# Patient Record
Sex: MALE | Race: WHITE | Employment: OTHER | URBAN - METROPOLITAN AREA
[De-identification: names, ages, dates, MRNs, and addresses within clinical notes are randomized per-mention and may not be internally consistent; named-entity substitution may affect disease eponyms.]

---

## 2019-05-09 ENCOUNTER — HOSPITAL ENCOUNTER (OUTPATIENT)
Dept: VASCULAR SURGERY | Age: 64
Discharge: HOME | End: 2019-05-09
Attending: FAMILY MEDICINE
Payer: COMMERCIAL

## 2019-05-09 DIAGNOSIS — R10.13 ABDOMINAL PAIN, EPIGASTRIC: ICD-10-CM

## 2019-05-09 LAB
CELIAC EDV: 48.5 CM/S
CELIAC PSV: 170.6 CM/S
DIST SMA EDV: 13 CM/S
DIST SMA PSV: 132.1 CM/S
MID SMA EDV: 38.8 CM/S
MID SMA PSV: 156.5 CM/S
PROX SMA EDV: 24.3 CM/S
PROX SMA PSV: 134.4 CM/S

## 2019-05-09 PROCEDURE — 93975 VASCULAR STUDY: CPT

## 2019-10-21 ENCOUNTER — OFFICE VISIT (OUTPATIENT)
Dept: ORTHOPEDIC SURGERY | Age: 64
End: 2019-10-21

## 2019-10-21 VITALS
SYSTOLIC BLOOD PRESSURE: 101 MMHG | DIASTOLIC BLOOD PRESSURE: 76 MMHG | HEIGHT: 74 IN | OXYGEN SATURATION: 94 % | HEART RATE: 87 BPM | BODY MASS INDEX: 24.59 KG/M2 | WEIGHT: 191.6 LBS | TEMPERATURE: 97.6 F

## 2019-10-21 DIAGNOSIS — S39.012A STRAIN OF LUMBAR REGION, INITIAL ENCOUNTER: ICD-10-CM

## 2019-10-21 DIAGNOSIS — M54.50 LUMBAR PAIN: Primary | ICD-10-CM

## 2019-10-21 DIAGNOSIS — M47.819 FACET ARTHROPATHY: ICD-10-CM

## 2019-10-21 RX ORDER — KETOROLAC TROMETHAMINE 15 MG/ML
60 INJECTION, SOLUTION INTRAMUSCULAR; INTRAVENOUS ONCE
Qty: 1 VIAL | Refills: 0
Start: 2019-10-21 | End: 2019-10-21

## 2019-10-21 RX ORDER — METHOCARBAMOL 500 MG/1
500 TABLET, FILM COATED ORAL
Qty: 30 TAB | Refills: 0 | Status: SHIPPED | OUTPATIENT
Start: 2019-10-21 | End: 2020-01-09

## 2019-10-21 RX ORDER — METHYLPREDNISOLONE 4 MG/1
TABLET ORAL
Qty: 1 DOSE PACK | Refills: 0 | Status: SHIPPED | OUTPATIENT
Start: 2019-10-21 | End: 2020-01-09

## 2019-10-21 NOTE — PATIENT INSTRUCTIONS
Theracane for massage     Back Strain: Care Instructions  Overview    A back strain happens when you overstretch, or pull, a muscle in your back. You may hurt your back in an accident or when you exercise or lift something. Sometimes you may not know how you hurt your back. Most back pain will get better with rest and time. You can take care of yourself at home to help your back heal.  Follow-up care is a key part of your treatment and safety. Be sure to make and go to all appointments, and call your doctor if you are having problems. It's also a good idea to know your test results and keep a list of the medicines you take. How can you care for yourself at home? · Try to stay as active as you can, but stop or reduce any activity that causes pain. · Put ice or a cold pack on the sore muscle for 10 to 20 minutes at a time to stop swelling. Try this every 1 to 2 hours for 3 days (when you are awake) or until the swelling goes down. Put a thin cloth between the ice pack and your skin. · After 2 or 3 days, apply a heating pad on low or a warm cloth to your back. Some doctors suggest that you go back and forth between hot and cold treatments. · Take pain medicines exactly as directed. ? If the doctor gave you a prescription medicine for pain, take it as prescribed. ? If you are not taking a prescription pain medicine, ask your doctor if you can take an over-the-counter medicine. · Try sleeping on your side with a pillow between your legs. Or put a pillow under your knees when you lie on your back. These measures can ease pain in your lower back. · Return to your usual level of activity slowly. When should you call for help? Call 911 anytime you think you may need emergency care. For example, call if:    · You are unable to move a leg at all.   [facility] your doctor now or seek immediate medical care if:    · You have new or worse symptoms in your legs, belly, or buttocks.  Symptoms may include:  ? Numbness or tingling. ? Weakness. ? Pain.     · You lose bladder or bowel control.    Watch closely for changes in your health, and be sure to contact your doctor if:    · You have a fever, lose weight, or don't feel well.     · You are not getting better as expected. Where can you learn more? Go to http://katelynn-sydnee.info/. Enter P715 in the search box to learn more about \"Back Strain: Care Instructions. \"  Current as of: June 26, 2019  Content Version: 12.2  © 6207-1757 [employer]. Care instructions adapted under license by [employer] (which disclaims liability or warranty for this information). If you have questions about a medical condition or this instruction, always ask your healthcare professional. [address] any warranty or liability for your use of this information. Back Stretches: Exercises  Introduction  Here are some examples of exercises for stretching your back. Start each exercise slowly. Ease off the exercise if you start to have pain. Your doctor or physical therapist will tell you when you can start these exercises and which ones will work best for you. How to do the exercises  Overhead stretch    1. Stand comfortably with your feet shoulder-width apart. 2. Looking straight ahead, raise both arms over your head and reach toward the ceiling. Do not allow your head to tilt back. 3. Hold for 15 to 30 seconds, then lower your arms to your sides. 4. Repeat 2 to 4 times. Side stretch    1. Stand comfortably with your feet shoulder-width apart. 2. Raise one arm over your head, and then lean to the other side. 3. Slide your hand down your leg as you let the weight of your arm gently stretch your side muscles. Hold for 15 to 30 seconds. 4. Repeat 2 to 4 times on each side. Press-up    1. Lie on your stomach, supporting your body with your forearms. 2. Press your elbows down into the floor to raise your upper back.  As you do this, relax your stomach muscles and allow your back to arch without using your back muscles. As your press up, do not let your hips or pelvis come off the floor. 3. Hold for 15 to 30 seconds, then relax. 4. Repeat 2 to 4 times. Relax and rest    1. Lie on your back with a rolled towel under your neck and a pillow under your knees. Extend your arms comfortably to your sides. 2. Relax and breathe normally. 3. Remain in this position for about 10 minutes. 4. If you can, do this 2 or 3 times each day. Follow-up care is a key part of your treatment and safety. Be sure to make and go to all appointments, and call your doctor if you are having problems. It's also a good idea to know your test results and keep a list of the medicines you take. Where can you learn more? Go to http://katelynn-sydnee.info/. Enter H222 in the search box to learn more about \"Back Stretches: Exercises. \"  Current as of: June 26, 2019  Content Version: 12.2  © 6436-3348 [employer]. Care instructions adapted under license by [employer] (which disclaims liability or warranty for this information). If you have questions about a medical condition or this instruction, always ask your healthcare professional. [address] any warranty or liability for your use of this information.

## 2019-10-21 NOTE — LETTER
10/21/19 Patient: Dewain Essex YOB: 1955 Date of Visit: 10/21/2019 Cinthya Reed MD 
[address] [facility] [address] VIA Facsimile: [phone] Dear Cinthya Reed MD, Thank you for referring Mr. Dewain Essex to [facility] for evaluation. My notes for this consultation are attached. If you have questions, please do not hesitate to call me. I look forward to following your patient along with you. Sincerely, Yenni Durán NP

## 2019-10-21 NOTE — PROGRESS NOTES
[place] Jones[place]ienne 1955 male who presents for Toradol 60 mg. Patient denies any symptoms , reactions or allergies that would exclude them from receiving injection today. Risks and adverse reactions were discussed and the VIS was given to them. All questions were addressed. Order placed for Margret Rg,  per Verbal Order from Dr. Samantha Simms with read back. Patient was observed for 15 min post injection. There were no reactions observed.     Sukhjinder Joel

## 2019-10-21 NOTE — PROGRESS NOTES
[facility]  [address] [facility]  Phone: [phone]  Fax: [phone]  NEW PATIENT  Patient: Aniceto Paulson                MRN: [id number]       SSN: [id number]  YOB: 1955        AGE: 62 y.o. SEX: male  Body mass index is 24.6 kg/m². PCP: Lindy Ovalle MD  10/21/19    No chief complaint on file. Aniceto Paulson is seen today in consultation at the request of self referral for complaints of back     HISTORY OF PRESENT ILLNESS:  Aniceto Paulson is a 62 y.o.  male with history of acute LBP pain for 1 week and radiation of pain to RLE in the anterior thigh in the L3 distribution. He reports that he was moving a mattress and he pulled his back. It is in the RLBP around the L4 region. He is neuro intact. His  Prior history of back problems: he has known facet arthropathy w/ foraminal stenosis L4-S1 per LS MRI 2016. He has diabetic peripheral neuropathy. . He denies any previous back issues. He has tried; PT-No,  Non-opioid medications Yes, and spinal injections No. Pain is aching and throbbing. Pain is worse with lifting, twisting and affects recreational activities. Pain is better with nothing. On exam, he has a R- sided TP consistent w/ an acute muscle strain. He is neuro intact. Denies bladder/bowel dysfunction, saddle paresthesia, weakness, gait disturbance, or other neurological deficits. Denies chills, fever,night sweats, unexplained weight loss/weight gain, chest pain, sob or anxiety. Pt at this time desires to  continue with current care/proceed with medication evaluation/proceed with evaluation of back pain. Medications OTC NSAIDs some benefit, has Motrin 600mg    PMHx: OTC Motrin w/     RADIOGRAPHS  4V LS Today  Moderate degenerative changes maybe increased since 2016  Final interpretation for Dr. Alondra Tello    ASSESSMENT   Diagnoses and all orders for this visit:    1.  Lumbar pain  -     AMB POC XRAY, SPINE, LUMBOSACRAL; 4+    2. Facet arthropathy    3. Strain of lumbar region, initial encounter  -     KETOROLAC TROMETHAMINE INJ  -     TN THER/PROPH/DIAG INJECTION, SUBCUT/IM    Other orders  -     methylPREDNISolone (MEDROL, TORO,) 4 mg tablet; Per dose pack instructions  -     ketorolac (TORADOL) 15 mg/mL soln injection; 4 mL by IntraMUSCular route once for 1 dose. -     methocarbamol (ROBAXIN) 500 mg tablet; Take 1 Tab by mouth two (2) times daily as needed for Pain. IMPRESSION AND PLAN:  This is a pt with an acute back strain and facet arthropathy w/ now RLE pain, neuro intact. I explained the Tx for an acute muscle strain and how exercises are important. He does not want PT, he will try some yoga stretches. We discussed meds, his current BS is . He will check his BS while he is on the MDP and stop it if it goes above 180 and call his PCP. Of note he was concerned about \"soft bones\" he does have osteopenia on x-ray, I offered ordering a DEXA he declined    > Pt was given information on BACK STRAIN AND EXERCISES   > Toradol foll MDP foll Motrin  > PRN Robaxin  > HEP  > Mr. Alivia Baugh has a reminder for a \"due or due soon\" health maintenance. I have asked that he contact his primary care provider, Ashish Pantoja MD, for follow-up on this health maintenance.  > We have informed patient to notify us for immediate appointment if he has any worsening neurogical symptoms or if an emergency situation presents, then call 911  >  has been reviewed and is appropriate  > Pt will follow-up in 6 wks w/ me.     Subjective    Work retired    Smoking Status non smoker    Pain Scale: 6/10    Pain Assessment  10/21/2019   Location of Pain Hip   Location Modifiers Right   Severity of Pain 8   Quality of Pain Aching   Duration of Pain A few hours   Frequency of Pain Intermittent   Aggravating Factors Standing;Walking;Straightening   Limiting Behavior Yes   Relieving Factors Rest         REVIEW OF SYSTEMS  Constitutional: Negative for fever, chills, or weight change. Respiratory: Negative for cough or shortness of breath. Cardiovascular: Negative for chest pain or palpitations. Gastrointestinal: Negative for incontinence, acid reflux, change in bowel habits, or constipation. Genitourinary: Negative for incontinence, dysuria and flank pain. Musculoskeletal: Positive for back and RLE pain. See HPI. Skin: Negative for rash. Neurological:RLE anterior thigh pain  radiculopathy. See HPI. Endo/Heme/Allergies: Negative. Psychiatric/Behavioral: Negative. PHYSICAL EXAMINATION  Visit Vitals  /76 (BP 1 Location: Left arm, BP Patient Position: Sitting)   Pulse 87   Temp 97.6 °F (36.4 °C) (Oral)   Ht 6' 2\" (1.88 m)   Wt 191 lb 9.6 oz (86.9 kg)   SpO2 94%   BMI 24.60 kg/m²         Accompanied by self. Constitutional:  Well developed, well nourished, in no acute distress. Psychiatric: Affect and mood are appropriate. Integumentary: No rashes or abrasions noted on exposed areas. Cardiovascular/Peripheral Vascular: +2 radial & pedal pulses. No peripheral edema is noted. Lymphatic:  No evidence of lymphedema. No cervical lymphadenopathy. SPINE/MUSCULOSKELETAL EXAM     Lumbar spine:  No rash, ecchymosis, or gross obliquity. No fasciculations. No focal atrophy is noted. Range of motion is intact with flexion, extension, turning right, turning left. Tenderness to palpation R LB L4 w/ TP. No tenderness to palpation at the sciatic notch. SI joints non-tender. Trochanters non tender. Straight leg raise Neg    Sensation grossly intact to light touch. MOTOR:     Hip Flex Quads Hamstrings Ankle DF EHL Ankle PF   Right 5/5 5/5 5/5 5/5 5/5 5/5   Left 5/5 5/5 5/5 5/5 5/5 5/5       DTRs are 2+ , patella, and Achilles. Ambulation without assistive device.  FWB.    normal gait and station        PAST MEDICAL HISTORY   Past Medical History:   Diagnosis Date    Anxiety     Classic migraine with aura     Dizziness     Fatigue     GERD (gastroesophageal reflux disease)     HTN (hypertension)     Insomnia     Kidney stones     Male erectile dysfunction     Polydipsia     Polyuria     Vitamin D deficiency        Past Surgical History:   Procedure Laterality Date    HX LITHOTRIPSY      2006, 2008, 2010 and 2013   . MEDICATIONS      Current Outpatient Medications   Medication Sig Dispense Refill    methylPREDNISolone (MEDROL, TORO,) 4 mg tablet Per dose pack instructions 1 Dose Pack 0    ketorolac (TORADOL) 15 mg/mL soln injection 4 mL by IntraMUSCular route once for 1 dose. 1 Vial 0    methocarbamol (ROBAXIN) 500 mg tablet Take 1 Tab by mouth two (2) times daily as needed for Pain. 30 Tab 0    cyanocobalamin (VITAMIN B-12) 100 mcg tablet Take 100 mcg by mouth daily.  gabapentin (NEURONTIN) 300 mg capsule       traZODone (DESYREL) 50 mg tablet Take 50 mg by mouth nightly as needed for Sleep.  ergocalciferol (ERGOCALCIFEROL) 50,000 unit capsule Take 50,000 Units by mouth. Take 1 capsule by mouth twice a week.  atorvastatin (LIPITOR) 40 mg tablet Take 1 Tab by mouth nightly. 30 Tab 0    OTHER Glucometer, test strips and lancets  Dispense: Sufficient quantity to check blood glucose four times daily for 30 days. Dx: DMII 1 Each 0    lisinopril (PRINIVIL, ZESTRIL) 20 mg tablet Take  by mouth daily.  Indications: HYPERTENSION          ALLERGIES  No Known Allergies       SOCIAL HISTORY    Social History     Socioeconomic History    Marital status: UNKNOWN     Spouse name: Not on file    Number of children: Not on file    Years of education: Not on file    Highest education level: Not on file   Occupational History    Occupation: Retired   Social Needs    Financial resource strain: Not on file    Food insecurity:     Worry: Not on file     Inability: Not on file   [employer] needs:     Medical: Not on file     Non-medical: Not on file   Tobacco Use    Smoking status: Former Smoker    Smokeless tobacco: Never Used   Substance and Sexual Activity    Alcohol use: Yes     Alcohol/week: 0.0 standard drinks    Drug use: No    Sexual activity: Not Currently   Lifestyle    Physical activity:     Days per week: Not on file     Minutes per session: Not on file    Stress: Not on file   Relationships    Social connections:     Talks on phone: Not on file     Gets together: Not on file     Attends Sabianism service: Not on file     Active member of club or organization: Not on file     Attends meetings of clubs or organizations: Not on file     Relationship status: Not on file    Intimate partner violence:     Fear of current or ex partner: Not on file     Emotionally abused: Not on file     Physically abused: Not on file     Forced sexual activity: Not on file   Other Topics Concern    Not on file   Social History Narrative    Not on file       FAMILY HISTORY  No family history on file.       Robina Winston NP

## 2019-11-12 NOTE — PROGRESS NOTES
Called and spoke with pt last Friday regarding LS xray w/ L3 comp fx and f/o w/ pain. He states he is now pain free, he cannot recall an injury in the past where he might have fx his back. He will follow up PRN since he is now pain free.

## 2019-12-26 PROBLEM — N20.0 KIDNEY STONE: Status: ACTIVE | Noted: 2019-12-26

## 2020-02-17 ENCOUNTER — OFFICE VISIT (OUTPATIENT)
Dept: ORTHOPEDIC SURGERY | Age: 65
End: 2020-02-17

## 2020-02-17 VITALS
OXYGEN SATURATION: 97 % | SYSTOLIC BLOOD PRESSURE: 145 MMHG | HEART RATE: 83 BPM | TEMPERATURE: 97.8 F | HEIGHT: 74 IN | DIASTOLIC BLOOD PRESSURE: 82 MMHG | WEIGHT: 186 LBS | RESPIRATION RATE: 16 BRPM | BODY MASS INDEX: 23.87 KG/M2

## 2020-02-17 DIAGNOSIS — S32.030D CLOSED COMPRESSION FRACTURE OF L3 LUMBAR VERTEBRA WITH ROUTINE HEALING, SUBSEQUENT ENCOUNTER: Primary | ICD-10-CM

## 2020-02-17 DIAGNOSIS — M81.0 AGE-RELATED OSTEOPOROSIS WITHOUT CURRENT PATHOLOGICAL FRACTURE: ICD-10-CM

## 2020-02-17 RX ORDER — OXYCODONE AND ACETAMINOPHEN 7.5; 325 MG/1; MG/1
1 TABLET ORAL 4 TIMES DAILY
COMMUNITY
End: 2022-03-29

## 2020-02-17 NOTE — PATIENT INSTRUCTIONS
Teriparatide (By injection)   Teriparatide (ter-i-PAR-a-tide)  Treats osteoporosis (weak or brittle bones) in men, and in women who have gone through menopause. Brand Name(s): Forteo   There may be other brand names for this medicine. When This Medicine Should Not Be Used: You should not use this medicine if you have had an allergic reaction to teriparatide. How to Use This Medicine:   Injectable  · Your doctor will prescribe your exact dose and tell you how often it should be given. This medicine is given as a shot under your skin. · This medicine should come with a Medication Guide. Ask your pharmacist for a copy if you do not have one. · You may be taught how to give your medicine at home. Make sure you understand all instructions before giving yourself an injection. Do not use more medicine or use it more often than your doctor tells you to. · You will be shown the body areas where this shot can be given. Use a different body area each time you give yourself a shot. Keep track of where you give each shot to make sure you rotate body areas. · This medicine comes in a special pre-filled pen. Be sure you understand how to use the pen to give yourself a shot. · Give yourself the shot in your thigh muscle or in your lower stomach area. Be sure to put the cap back on the pen after giving yourself a shot. Put the pen back in the refrigerator right after you are done giving yourself the shot. · You can use this medicine at any time of the day, however using it at the same time each day may help you remember to take your shot. · When you first start using this medicine, give yourself a shot while you are in an area where it is easy for you to sit or lay down right away if you become dizzy. · Do not use this medicine if it looks cloudy, colored, or if it has specks in it. The medicine inside the pen should be clear and colorless.   · Do not use a pen for more than 28 days (4 weeks) after it has been opened, even if there is still some medicine in it. · You should not use this medicine for longer than 2 years. It is only part of a complete plan for treating osteoporosis. Ask your doctor about other things you can do to help yourself. This may include taking vitamin or mineral supplements, getting regular exercise, and not smoking. If a dose is missed:   · Take a dose as soon as you remember. If it is almost time for your next dose, wait until then and take a regular dose. Do not take extra medicine to make up for a missed dose. How to Store and Dispose of This Medicine:   · Store the medicine pen in the refrigerator. Take the pen out of the refrigerator only long enough to give yourself a shot. Then put it back in the refrigerator right away. Do not freeze the pen. Do not use this medicine if it has been frozen. · Ask your pharmacist, doctor, or health caregiver about the best way to dispose of any leftover medicine and the used pen. You will also need to throw away old medicine 28 days after the first time you use the pen, or after the expiration date has passed. · Keep all medicine out of the reach of children. Never share your medicine with anyone. Drugs and Foods to Avoid:   Ask your doctor or pharmacist before using any other medicine, including over-the-counter medicines, vitamins, and herbal products. · Make sure your doctor knows if you are also using digoxin (Lanoxin®). Warnings While Using This Medicine:   · Make sure your doctor knows if you are pregnant or breastfeeding, or if you have heart disease, liver disease, kidney disease, kidney stones, or if you are on dialysis. · Make sure your doctor knows if you have hypercalcemia (high levels of calcium in your blood), or if you have a disease that may cause you to have hypercalcemia, such as hyperparathyroidism (overactive parathyroid gland). · When this medicine was tested in rats, some of the rats developed bone cancer.  It is not known if the same thing could happen in people. Your risk of bone cancer may be higher if you have Paget's disease or another bone disease, or if you are still growing. Your risk may also be higher if you have ever had bone cancer, or if you have had external beam or implant radiation treatment on your bones. Make sure your doctor knows if you have ever had any of these bone conditions or treatments. · This medicine may make you dizzy or drowsy. Avoid driving, using machines, or doing anything else that could be dangerous if you are not alert. · You are more likely to get dizzy, lightheaded, or have a pounding heartbeat when you first start using this medicine. Sit or lie down if this happens. Call your doctor if these side effects do not go away, or get worse. · Tell any doctor or dentist who treats you that you are using this medicine. This medicine may affect certain medical test results. · Your doctor will do lab tests at regular visits to check on the effects of this medicine. Keep all appointments. Possible Side Effects While Using This Medicine:   Call your doctor right away if you notice any of these side effects:  · Allergic reaction: Itching or hives, swelling in your face or hands, swelling or tingling in your mouth or throat, chest tightness, trouble breathing  · Chest pain, fast heartbeat. · Leg cramps, joint pain, or muscle spasms. · Lightheadedness or fainting. · Nausea, vomiting, or constipation. · Unusual tiredness or weakness. If you notice these less serious side effects, talk with your doctor:   · Diarrhea or upset stomach. · Headache or neck pain. · Redness, pain, swelling, itching, bruising, or bleeding where the shot was given. · Runny or stuffy nose, cough. · Skin rash. · Sweating. · Trouble sleeping. If you notice other side effects that you think are caused by this medicine, tell your doctor. Call your doctor for medical advice about side effects.  You may report side effects to FDA at 1-800-FDA-1088  © 2017 [facility] Information is for End User's use only and may not be sold, redistributed or otherwise used for commercial purposes. The above information is an  only. It is not intended as medical advice for individual conditions or treatments. Talk to your doctor, nurse or pharmacist before following any medical regimen to see if it is safe and effective for you.

## 2020-02-17 NOTE — PROGRESS NOTES
[facility]  [address] [facility], [facility] [address]  Phone: [phone]  Fax: [phone]  PROGRESS NOTE  Patient: Rajan Dang                MRN: [id number]       SSN: [id number]  YOB: 1955        AGE: 62 y.o. SEX: male  Body mass index is 23.88 kg/m². PCP: Meghana Das MD  02/17/20    Chief Complaint   Patient presents with    Back Pain     lumbar pain, f/u appt. HISTORY OF PRESENT ILLNESS:  Rajan Dang is a 62 y.o.  male with history of acute LBP pain  radiation of pain to RLE in the anterior thigh in the L3 distribution after moving a mattress 4 mo ago. I obtained x-rays which showed an L3 fx and degenerative changes. I wanted to obtain an MRI, he didn't get one as he reported that he was pain free when I called him to follow-up. Today, he comes in for follow up of his hx of L3 fx. He has no back or leg pain. He is pain free, we discussed Tx of his osteoporosis. He will think about that. He is currently scheduled for a kidney stent next month for his kidney stones    Denies bladder/bowel dysfunction, saddle paresthesia, weakness, gait disturbance, or other neurological deficits. Medications: none    PMHx: Kidney stones      ASSESSMENT   Diagnoses and all orders for this visit:    1. Closed compression fracture of L3 lumbar vertebra with routine healing, subsequent encounter  -     AMB POC XRAY, SPINE, LUMBOSACRAL; 2 O    2. Age-related osteoporosis without current pathological fracture         IMPRESSION AND PLAN:  This is a pt with a healed L3 comp fx. I got a set of x-rays today for Dr Raymund Schilder review. We discussed Tx of his osteoporosis and obtaining a baseline DEXA, he would like to wait on that and discuss things with his PCP. I will see him back in 3 mo to discuss his decisions on his osteoporosis.     > Pt was given information on [facility]   > LS xrays today  > HEP  > Mr. Ron Smith has a reminder for a \"due or due soon\" health maintenance. I have asked that he contact his primary care provider, Alicia Perez MD, for follow-up on this health maintenance.  > We have informed patient to notify us for immediate appointment if he has any worsening neurogical symptoms or if an emergency situation presents, then call 911  >  has been reviewed and is appropriate  > Pt will follow-up in 3 mo w/ me. Subjective    Pain Scale: 0 - No pain/10    Pain Assessment  2/17/2020   Location of Pain Back   Location Modifiers (No Data)   Severity of Pain 0   Quality of Pain Other (Comment)   Quality of Pain Comment numbness & tingling to puneet lower ext. Duration of Pain (No Data)   Duration of Pain Comment n/a   Frequency of Pain (No Data)   Frequency of Pain Comment n/a   Aggravating Factors (No Data)   Aggravating Factors Comment n/a   Limiting Behavior No   Relieving Factors Exercises   Relieving Factors Comment walking   Result of Injury Yes   Work-Related Injury No   Type of Injury Other (Comment)         REVIEW OF SYSTEMS  Constitutional: Negative for fever, chills, or weight change. Respiratory: Negative for cough or shortness of breath. Cardiovascular: Negative for chest pain or palpitations. Gastrointestinal: Negative for incontinence, acid reflux, change in bowel habits, or constipation. Genitourinary: Negative for incontinence, dysuria and flank pain. Musculoskeletal: Positive for no pain. See HPI. Skin: Negative for rash. Neurological:no  radiculopathy. See HPI. Endo/Heme/Allergies: Negative. Psychiatric/Behavioral: Negative. PHYSICAL EXAMINATION  Visit Vitals  /82 (BP 1 Location: Left arm, BP Patient Position: Sitting)   Pulse 83   Temp 97.8 °F (36.6 °C) (Oral)   Resp 16   Ht 6' 2\" (1.88 m)   Wt 186 lb (84.4 kg)   SpO2 97%   BMI 23.88 kg/m²         Accompanied by self. Constitutional:  Well developed, well nourished, in no acute distress.    Psychiatric: Affect and mood are appropriate. Integumentary: No rashes or abrasions noted on exposed areas. Cardiovascular/Peripheral Vascular: +2 radial & pedal pulses. No peripheral edema is noted. Lymphatic:  No evidence of lymphedema. No cervical lymphadenopathy. SPINE/MUSCULOSKELETAL EXAM     Lumbar spine:  No rash, ecchymosis, or gross obliquity. No fasciculations. No focal atrophy is noted. Range of motion is intact with flexion, extension, turning right, turning left. Tenderness to palpation none. No tenderness to palpation at the sciatic notch. SI joints non-tender. Trochanters non tender. Straight leg raise neg  Hip Impingement neg    Sensation grossly intact to light touch. MOTOR:     Hip Flex Quads Hamstrings Ankle DF EHL Ankle PF   Right 5/5 5/5 5/5 5/5 5/5 5/5   Left 5/5 5/5 5/5 5/5 5/5 5/5       Ambulation without assistive device. FWB.    normal gait and station        PAST MEDICAL HISTORY   Past Medical History:   Diagnosis Date    Anxiety     Classic migraine with aura     Dizziness     Fatigue     GERD (gastroesophageal reflux disease)     HTN (hypertension)     Insomnia     Kidney stones     Male erectile dysfunction     Polydipsia     Polyuria     Vitamin D deficiency        Past Surgical History:   Procedure Laterality Date    HX LITHOTRIPSY      2006, 2008, 2010 and 2013   . MEDICATIONS      Current Outpatient Medications   Medication Sig Dispense Refill    oxyCODONE-acetaminophen (PERCOCET) 7.5-325 mg per tablet Take  by mouth.  cyanocobalamin (VITAMIN B-12) 100 mcg tablet Take 100 mcg by mouth daily.  gabapentin (NEURONTIN) 300 mg capsule       traZODone (DESYREL) 50 mg tablet Take 50 mg by mouth nightly as needed for Sleep.  ergocalciferol (ERGOCALCIFEROL) 50,000 unit capsule Take 50,000 Units by mouth. Take 1 capsule by mouth twice a week.  atorvastatin (LIPITOR) 40 mg tablet Take 1 Tab by mouth nightly.  30 Tab 0    lisinopril (PRINIVIL, ZESTRIL) 20 mg tablet Take  by mouth daily. Indications: HYPERTENSION          ALLERGIES  No Known Allergies       SOCIAL HISTORY    Social History     Socioeconomic History    Marital status: UNKNOWN     Spouse name: Not on file    Number of children: Not on file    Years of education: Not on file    Highest education level: Not on file   Occupational History    Occupation: Retired   Social Needs    Financial resource strain: Not on file    Food insecurity:     Worry: Not on file     Inability: Not on file   [employer] needs:     Medical: Not on file     Non-medical: Not on file   Tobacco Use    Smoking status: Former Smoker    Smokeless tobacco: Never Used   Substance and Sexual Activity    Alcohol use: Yes     Alcohol/week: 0.0 standard drinks    Drug use: No    Sexual activity: Not Currently   Lifestyle    Physical activity:     Days per week: Not on file     Minutes per session: Not on file    Stress: Not on file   Relationships    Social connections:     Talks on phone: Not on file     Gets together: Not on file     Attends [employer] service: Not on file     Active member of club or organization: Not on file     Attends meetings of clubs or organizations: Not on file     Relationship status: Not on file    Intimate partner violence:     Fear of current or ex partner: Not on file     Emotionally abused: Not on file     Physically abused: Not on file     Forced sexual activity: Not on file   Other Topics Concern    Not on file   Social History Narrative    Not on file       FAMILY HISTORY  No family history on file.       Heather Bedoya, NP

## 2020-02-24 ENCOUNTER — HOSPITAL ENCOUNTER (OUTPATIENT)
Dept: PREADMISSION TESTING | Age: 65
Discharge: HOME | End: 2020-02-24
Payer: COMMERCIAL

## 2020-02-24 ENCOUNTER — HOSPITAL ENCOUNTER (OUTPATIENT)
Dept: LAB | Age: 65
Discharge: HOME | End: 2020-02-24

## 2020-02-24 DIAGNOSIS — N20.0 KIDNEY STONE: ICD-10-CM

## 2020-02-24 LAB
ABO + RH BLD: NORMAL
ATRIAL RATE: 77 BPM
BLOOD GROUP ANTIBODIES SERPL: NORMAL
CALCULATED P AXIS, ECG09: 53 DEGREES
CALCULATED R AXIS, ECG10: 61 DEGREES
CALCULATED T AXIS, ECG11: 47 DEGREES
DIAGNOSIS, 93000: NORMAL
P-R INTERVAL, ECG05: 126 MS
Q-T INTERVAL, ECG07: 368 MS
QRS DURATION, ECG06: 94 MS
QTC CALCULATION (BEZET), ECG08: 416 MS
SENTARA SPECIMEN COL,SENBCF: NORMAL
SPECIMEN EXP DATE BLD: NORMAL
VENTRICULAR RATE, ECG03: 77 BPM

## 2020-02-24 PROCEDURE — 93005 ELECTROCARDIOGRAM TRACING: CPT

## 2020-02-24 PROCEDURE — 86900 BLOOD TYPING SEROLOGIC ABO: CPT

## 2020-02-24 PROCEDURE — 99001 SPECIMEN HANDLING PT-LAB: CPT

## 2020-02-26 NOTE — H&P (VIEW-ONLY)
Michael ROSALIO Acosta 1955 Assessment: 
 
1. 2.5cm left lower pole stone Creatinine 2/24/2020- 1.1 Litholink 8/6/2019- Hyperoxaluria 2. Right renal cysts Not an issue at this time, will monitor with yearly imaging 3. Left lower pole column of Burtin vs lower pole renal mass 4. Prostate cancer screening PSA 4/30/2019- 0.64 Fhx of prostate cancer through father 5. H/o kidney stones s/p ESWL x3 
 
6. DM- Last HgB A1C 2/25/2020- 5.4% 7. HTN Doing well. Proceed to PCNL. Culture sent. Multiple questions answered. Plan: 1. Reviewed CT Urogram 
2. Continue with plan for left PCNL 3. Urine today sent for culture. 4. RTC post-op 5. Rx for Flomax provided, risks, benefits, and SE discussed Discussion: 
We discussed the indications and risks/benefits of PCNL including infection, bleeding, blood transfusion, post operative pain, injury to surrounding structures including bowel/liver/spleen/lung requiring open repair, injury to kidney requiring prolonged drainage and/or surgical repair. We discussed possible need for multiple procedures to clear stone burden. We also discussed global anesthesia and perioperative risks including stroke, heart attack, DVT, PE, and death. Also expressed need for an overnight stay at the hospital. The patient expresses understanding. Chief Complaint Patient presents with  Renal Mass Pt here for pre-op appt.  Renal Cyst  
 Kidney Saumya Canales History of Present Illness:  Sera Contreras is a 62 y.o. male who presents today for follow up of kidney stones. Pt with a 2.5cm left lower pole stone noted on CT A/P 4/24/19. CT was performed for LUQ cramping and nausea. At initial visit 4/29/19 it was recommended that he have a PCNL for treatment of this stone. Pt wanted to discuss with his wife prior to making a decision. Returns today pre-op left PCNL 3/9/2020 CT urogram in the interim with some growth of the stone and no renal mass. Doing well in the interim. No flank pain or UTIs in the interim. No bothersome voiding sx's reports. Good FOS. No incontinence or urgency. Denies gross hematuria, dysuria, asymptomatic for infection. No f/c/n/v. FHx of prostate cancer in father. No FHx of kidney or bladder cancer. Patient getting PSAs with PCP. Hx of kidney stones, s/p ESWL x4 He stopped his Topamax and Percocet, now on Gabapentin CT Urogram 1/30/2020: 
IMPRESSION 
  
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 Additional subcentimeter bilateral nonobstructive renal calculi are present. Mild circumferential wall thickening is present in the left renal collecting system, which could be related to mild inflammatory change. No ureteral or bladder calculus. 
  
Mild circumferential bladder wall thickening could be related to chronic partial outlet obstruction from mildly enlarged prostate gland. 
  
No solid renal lesion, or collecting system filling defect. Ashely Jose Litholink 8/6/19: 
 
4/23/19 CT abd pelv w/o cont I personally reviewed the images ? Left lower pole Column of burtin vs renal mass IMPRESSION A few nonobstructive left renal calculi are present in the left lower pole which measure up to 2.5cm with HU 1245. Few tiny punctate nonobstructive right renal calculi. No ureteral or bladder calculus. No hydronephrosis. Past Medical History:  
Diagnosis Date  Anxiety  Classic migraine with aura  Dizziness  Fatigue  GERD (gastroesophageal reflux disease)  HTN (hypertension)  Insomnia  Kidney stones  Male erectile dysfunction  Polydipsia  Polyuria  Vitamin D deficiency Past Surgical History:  
Procedure Laterality Date  HX LITHOTRIPSY 2006, 2008, 2010 and 2013 Social History Tobacco Use  Smoking status: Former Smoker  Smokeless tobacco: Never Used Substance Use Topics  Alcohol use: Yes Alcohol/week: 0.0 standard drinks  Drug use: No  
 
 
No Known Allergies History reviewed. No pertinent family history. Current Outpatient Medications Medication Sig Dispense Refill  tamsulosin (FLOMAX) 0.4 mg capsule Take 1 Cap by mouth daily (after dinner). 30 Cap 0  
 oxyCODONE-acetaminophen (PERCOCET) 7.5-325 mg per tablet Take  by mouth.  cyanocobalamin (VITAMIN B-12) 100 mcg tablet Take 100 mcg by mouth daily.  gabapentin (NEURONTIN) 300 mg capsule  traZODone (DESYREL) 50 mg tablet Take 50 mg by mouth nightly as needed for Sleep.  ergocalciferol (ERGOCALCIFEROL) 50,000 unit capsule Take 50,000 Units by mouth. Take 1 capsule by mouth twice a week.  atorvastatin (LIPITOR) 40 mg tablet Take 1 Tab by mouth nightly. 30 Tab 0  
 lisinopril (PRINIVIL, ZESTRIL) 20 mg tablet Take 10 mg by mouth daily. Indications: high blood pressure Review of Systems Constitutional: Fever: No 
Skin: Rash: No 
HEENT: Hearing difficulty: No 
Eyes: Blurred vision: No 
Cardiovascular: Chest pain: No 
Respiratory: Shortness of breath: No 
Gastrointestinal: Nausea/vomiting: No 
Musculoskeletal: Back pain: No 
Neurological: Weakness: No 
Psychological: Memory loss: No 
Comments/additional findings: PHYSICAL EXAMINATION:  
 
Visit Vitals /82 Ht 6' 2\" (1.88 m) Wt 186 lb (84.4 kg) BMI 23.88 kg/m² Constitutional: WDWN, Pleasant and appropriate affect, No acute distress. CV:  No peripheral swelling noted Respiratory: No respiratory distress or difficulties Abdomen:  No abdominal masses or tenderness. No CVA tenderness. No hernias noted.  Male:  No sp fullness. Skin: No jaundice. Neuro/Psych:  Alert and oriented x 3. Affect appropriate. Lymphatic:   No enlarged inguinal lymph nodes.    
 
REVIEW OF LABS:   
 
 Results for orders placed or performed in visit on 02/26/20 AMB POC URINALYSIS DIP STICK AUTO W/O MICRO Result Value Ref Range Color (UA POC) Yellow Clarity (UA POC) Clear Glucose (UA POC) Negative Negative Bilirubin (UA POC) Negative Negative Ketones (UA POC) Negative Negative Specific gravity (UA POC) 1.015 1.001 - 1.035 Blood (UA POC) Negative Negative pH (UA POC) 6.5 4.6 - 8.0 Protein (UA POC) Negative Negative Urobilinogen (UA POC) 0.2 mg/dL 0.2 - 1 Nitrites (UA POC) Negative Negative Leukocyte esterase (UA POC) 1+ Negative A copy of today's office visit with all pertinent imaging results and labs were sent to the referring MD Aster Khan MD on 2/26/2020 Medical documentation provided with the assistance of Wei Art. Mary Kate Alvarado medical scribe for Aster Bhandari MD on 2/26/2020.

## 2020-03-06 ENCOUNTER — ANESTHESIA EVENT (OUTPATIENT)
Dept: SURGERY | Age: 65
End: 2020-03-06
Payer: COMMERCIAL

## 2020-03-09 ENCOUNTER — APPOINTMENT (OUTPATIENT)
Dept: GENERAL RADIOLOGY | Age: 65
End: 2020-03-09
Attending: UROLOGY
Payer: COMMERCIAL

## 2020-03-09 ENCOUNTER — HOSPITAL ENCOUNTER (OUTPATIENT)
Age: 65
Discharge: HOME | End: 2020-03-10
Attending: UROLOGY | Admitting: UROLOGY
Payer: COMMERCIAL

## 2020-03-09 ENCOUNTER — ANESTHESIA (OUTPATIENT)
Dept: SURGERY | Age: 65
End: 2020-03-09
Payer: COMMERCIAL

## 2020-03-09 DIAGNOSIS — N20.0 KIDNEY STONE: Primary | ICD-10-CM

## 2020-03-09 LAB
GLUCOSE BLD STRIP.AUTO-MCNC: 94 MG/DL (ref 70–110)
GLUCOSE BLD STRIP.AUTO-MCNC: 95 MG/DL (ref 70–110)
GLUCOSE BLD STRIP.AUTO-MCNC: 97 MG/DL (ref 70–110)
GLUCOSE BLD STRIP.AUTO-MCNC: 98 MG/DL (ref 70–110)

## 2020-03-09 PROCEDURE — 76010000171 HC OR TIME 2 TO 2.5 HR INTENSV-TIER 1: Performed by: UROLOGY

## 2020-03-09 PROCEDURE — 74011250637 HC RX REV CODE- 250/637: Performed by: UROLOGY

## 2020-03-09 PROCEDURE — C1769 GUIDE WIRE: HCPCS | Performed by: UROLOGY

## 2020-03-09 PROCEDURE — 77030018836 HC SOL IRR NACL ICUM -A: Performed by: UROLOGY

## 2020-03-09 PROCEDURE — 77030002933 HC SUT MCRYL J&J -A: Performed by: UROLOGY

## 2020-03-09 PROCEDURE — C1758 CATHETER, URETERAL: HCPCS | Performed by: UROLOGY

## 2020-03-09 PROCEDURE — 74011000250 HC RX REV CODE- 250: Performed by: NURSE ANESTHETIST, CERTIFIED REGISTERED

## 2020-03-09 PROCEDURE — 74011250636 HC RX REV CODE- 250/636: Performed by: UROLOGY

## 2020-03-09 PROCEDURE — 77030038846 HC SCPE URETSCP LITHOVUE DISP BSC -H: Performed by: UROLOGY

## 2020-03-09 PROCEDURE — 77030005537 HC CATH URETH BARD -A: Performed by: UROLOGY

## 2020-03-09 PROCEDURE — 77030019908 HC STETH ESOPH SIMS -A: Performed by: ANESTHESIOLOGY

## 2020-03-09 PROCEDURE — 77030012961 HC IRR KT CYSTO/TUR ICUM -A: Performed by: UROLOGY

## 2020-03-09 PROCEDURE — 77030019605: Performed by: UROLOGY

## 2020-03-09 PROCEDURE — 74011250637 HC RX REV CODE- 250/637: Performed by: NURSE ANESTHETIST, CERTIFIED REGISTERED

## 2020-03-09 PROCEDURE — 87205 SMEAR GRAM STAIN: CPT

## 2020-03-09 PROCEDURE — 82360 CALCULUS ASSAY QUANT: CPT

## 2020-03-09 PROCEDURE — 77030008683 HC TU ET CUF COVD -A: Performed by: ANESTHESIOLOGY

## 2020-03-09 PROCEDURE — 87075 CULTR BACTERIA EXCEPT BLOOD: CPT

## 2020-03-09 PROCEDURE — 77030031139 HC SUT VCRL2 J&J -A: Performed by: UROLOGY

## 2020-03-09 PROCEDURE — C1894 INTRO/SHEATH, NON-LASER: HCPCS | Performed by: UROLOGY

## 2020-03-09 PROCEDURE — 77030027138 HC INCENT SPIROMETER -A

## 2020-03-09 PROCEDURE — 74011000250 HC RX REV CODE- 250: Performed by: UROLOGY

## 2020-03-09 PROCEDURE — 76060000035 HC ANESTHESIA 2 TO 2.5 HR: Performed by: UROLOGY

## 2020-03-09 PROCEDURE — 74011250636 HC RX REV CODE- 250/636: Performed by: NURSE ANESTHETIST, CERTIFIED REGISTERED

## 2020-03-09 PROCEDURE — 77030040361 HC SLV COMPR DVT MDII -B: Performed by: UROLOGY

## 2020-03-09 PROCEDURE — 74425 UROGRAPHY ANTEGRADE RS&I: CPT

## 2020-03-09 PROCEDURE — 82962 GLUCOSE BLOOD TEST: CPT

## 2020-03-09 PROCEDURE — 77030040922 HC BLNKT HYPOTHRM STRY -A: Performed by: UROLOGY

## 2020-03-09 PROCEDURE — 76210000000 HC OR PH I REC 2 TO 2.5 HR: Performed by: UROLOGY

## 2020-03-09 PROCEDURE — 74011000258 HC RX REV CODE- 258: Performed by: UROLOGY

## 2020-03-09 PROCEDURE — C1726 CATH, BAL DIL, NON-VASCULAR: HCPCS | Performed by: UROLOGY

## 2020-03-09 PROCEDURE — 65270000029 HC RM PRIVATE

## 2020-03-09 PROCEDURE — 77030040831 HC BAG URINE DRNG MDII -A: Performed by: UROLOGY

## 2020-03-09 PROCEDURE — 77030040817 HC PRB KT TRIL LITHO BSC -G: Performed by: UROLOGY

## 2020-03-09 PROCEDURE — 74011636320 HC RX REV CODE- 636/320: Performed by: UROLOGY

## 2020-03-09 PROCEDURE — C2617 STENT, NON-COR, TEM W/O DEL: HCPCS | Performed by: UROLOGY

## 2020-03-09 DEVICE — URETERAL STENT
Type: IMPLANTABLE DEVICE | Site: URETER | Status: FUNCTIONAL
Brand: POLARIS™ ULTRA

## 2020-03-09 RX ORDER — OXYBUTYNIN CHLORIDE 5 MG/1
5 TABLET ORAL
Status: DISCONTINUED | OUTPATIENT
Start: 2020-03-09 | End: 2020-03-10

## 2020-03-09 RX ORDER — SODIUM CHLORIDE 0.9 % (FLUSH) 0.9 %
5-40 SYRINGE (ML) INJECTION EVERY 8 HOURS
Status: DISCONTINUED | OUTPATIENT
Start: 2020-03-09 | End: 2020-03-10 | Stop reason: HOSPADM

## 2020-03-09 RX ORDER — TRAZODONE HYDROCHLORIDE 50 MG/1
50 TABLET ORAL
Status: DISCONTINUED | OUTPATIENT
Start: 2020-03-09 | End: 2020-03-10 | Stop reason: HOSPADM

## 2020-03-09 RX ORDER — ATROPA BELLADONNA AND OPIUM 16.2; 3 MG/1; MG/1
1 SUPPOSITORY RECTAL ONCE
Status: DISCONTINUED | OUTPATIENT
Start: 2020-03-09 | End: 2020-03-09 | Stop reason: HOSPADM

## 2020-03-09 RX ORDER — GABAPENTIN 300 MG/1
300 CAPSULE ORAL 4 TIMES DAILY
Status: DISCONTINUED | OUTPATIENT
Start: 2020-03-09 | End: 2020-03-10 | Stop reason: HOSPADM

## 2020-03-09 RX ORDER — FENTANYL CITRATE 50 UG/ML
INJECTION, SOLUTION INTRAMUSCULAR; INTRAVENOUS AS NEEDED
Status: DISCONTINUED | OUTPATIENT
Start: 2020-03-09 | End: 2020-03-09 | Stop reason: HOSPADM

## 2020-03-09 RX ORDER — ATORVASTATIN CALCIUM 40 MG/1
40 TABLET, FILM COATED ORAL
Status: DISCONTINUED | OUTPATIENT
Start: 2020-03-09 | End: 2020-03-10 | Stop reason: HOSPADM

## 2020-03-09 RX ORDER — INSULIN LISPRO 100 [IU]/ML
INJECTION, SOLUTION INTRAVENOUS; SUBCUTANEOUS
Status: DISCONTINUED | OUTPATIENT
Start: 2020-03-09 | End: 2020-03-10 | Stop reason: HOSPADM

## 2020-03-09 RX ORDER — INSULIN LISPRO 100 [IU]/ML
INJECTION, SOLUTION INTRAVENOUS; SUBCUTANEOUS ONCE
Status: DISCONTINUED | OUTPATIENT
Start: 2020-03-09 | End: 2020-03-09 | Stop reason: HOSPADM

## 2020-03-09 RX ORDER — MAGNESIUM SULFATE 100 %
16 CRYSTALS MISCELLANEOUS AS NEEDED
Status: DISCONTINUED | OUTPATIENT
Start: 2020-03-09 | End: 2020-03-09 | Stop reason: SDUPTHER

## 2020-03-09 RX ORDER — SODIUM CHLORIDE 0.9 % (FLUSH) 0.9 %
5-40 SYRINGE (ML) INJECTION EVERY 8 HOURS
Status: DISCONTINUED | OUTPATIENT
Start: 2020-03-09 | End: 2020-03-09 | Stop reason: HOSPADM

## 2020-03-09 RX ORDER — KETOROLAC TROMETHAMINE 15 MG/ML
15 INJECTION, SOLUTION INTRAMUSCULAR; INTRAVENOUS ONCE
Status: COMPLETED | OUTPATIENT
Start: 2020-03-09 | End: 2020-03-09

## 2020-03-09 RX ORDER — DOCUSATE SODIUM 100 MG/1
100 CAPSULE, LIQUID FILLED ORAL 2 TIMES DAILY
Status: DISCONTINUED | OUTPATIENT
Start: 2020-03-09 | End: 2020-03-10 | Stop reason: HOSPADM

## 2020-03-09 RX ORDER — SODIUM CHLORIDE 0.9 % (FLUSH) 0.9 %
5-40 SYRINGE (ML) INJECTION AS NEEDED
Status: DISCONTINUED | OUTPATIENT
Start: 2020-03-09 | End: 2020-03-09 | Stop reason: HOSPADM

## 2020-03-09 RX ORDER — FAMOTIDINE 20 MG/1
20 TABLET, FILM COATED ORAL ONCE
Status: COMPLETED | OUTPATIENT
Start: 2020-03-09 | End: 2020-03-09

## 2020-03-09 RX ORDER — PROPOFOL 10 MG/ML
INJECTION, EMULSION INTRAVENOUS AS NEEDED
Status: DISCONTINUED | OUTPATIENT
Start: 2020-03-09 | End: 2020-03-09 | Stop reason: HOSPADM

## 2020-03-09 RX ORDER — BUPIVACAINE HYDROCHLORIDE 5 MG/ML
INJECTION, SOLUTION EPIDURAL; INTRACAUDAL AS NEEDED
Status: DISCONTINUED | OUTPATIENT
Start: 2020-03-09 | End: 2020-03-09 | Stop reason: HOSPADM

## 2020-03-09 RX ORDER — GLYCOPYRROLATE 0.2 MG/ML
INJECTION INTRAMUSCULAR; INTRAVENOUS AS NEEDED
Status: DISCONTINUED | OUTPATIENT
Start: 2020-03-09 | End: 2020-03-09 | Stop reason: HOSPADM

## 2020-03-09 RX ORDER — PHENYLEPHRINE HCL IN 0.9% NACL 1 MG/10 ML
SYRINGE (ML) INTRAVENOUS AS NEEDED
Status: DISCONTINUED | OUTPATIENT
Start: 2020-03-09 | End: 2020-03-09 | Stop reason: HOSPADM

## 2020-03-09 RX ORDER — ONDANSETRON 2 MG/ML
INJECTION INTRAMUSCULAR; INTRAVENOUS AS NEEDED
Status: DISCONTINUED | OUTPATIENT
Start: 2020-03-09 | End: 2020-03-09 | Stop reason: HOSPADM

## 2020-03-09 RX ORDER — KETOROLAC TROMETHAMINE 15 MG/ML
INJECTION, SOLUTION INTRAMUSCULAR; INTRAVENOUS
Status: DISPENSED
Start: 2020-03-09 | End: 2020-03-09

## 2020-03-09 RX ORDER — MIDAZOLAM HYDROCHLORIDE 1 MG/ML
INJECTION, SOLUTION INTRAMUSCULAR; INTRAVENOUS AS NEEDED
Status: DISCONTINUED | OUTPATIENT
Start: 2020-03-09 | End: 2020-03-09 | Stop reason: HOSPADM

## 2020-03-09 RX ORDER — DEXTROSE 50 % IN WATER (D50W) INTRAVENOUS SYRINGE
25-50 AS NEEDED
Status: DISCONTINUED | OUTPATIENT
Start: 2020-03-09 | End: 2020-03-09 | Stop reason: SDUPTHER

## 2020-03-09 RX ORDER — SUCCINYLCHOLINE CHLORIDE 20 MG/ML
INJECTION INTRAMUSCULAR; INTRAVENOUS AS NEEDED
Status: DISCONTINUED | OUTPATIENT
Start: 2020-03-09 | End: 2020-03-09 | Stop reason: HOSPADM

## 2020-03-09 RX ORDER — ONDANSETRON 2 MG/ML
4 INJECTION INTRAMUSCULAR; INTRAVENOUS
Status: DISCONTINUED | OUTPATIENT
Start: 2020-03-09 | End: 2020-03-10 | Stop reason: HOSPADM

## 2020-03-09 RX ORDER — HYDROMORPHONE HYDROCHLORIDE 2 MG/ML
1 INJECTION, SOLUTION INTRAMUSCULAR; INTRAVENOUS; SUBCUTANEOUS
Status: DISCONTINUED | OUTPATIENT
Start: 2020-03-09 | End: 2020-03-10 | Stop reason: HOSPADM

## 2020-03-09 RX ORDER — NEOSTIGMINE METHYLSULFATE 1 MG/ML
INJECTION, SOLUTION INTRAVENOUS AS NEEDED
Status: DISCONTINUED | OUTPATIENT
Start: 2020-03-09 | End: 2020-03-09 | Stop reason: HOSPADM

## 2020-03-09 RX ORDER — ROCURONIUM BROMIDE 10 MG/ML
INJECTION, SOLUTION INTRAVENOUS AS NEEDED
Status: DISCONTINUED | OUTPATIENT
Start: 2020-03-09 | End: 2020-03-09 | Stop reason: HOSPADM

## 2020-03-09 RX ORDER — KETOROLAC TROMETHAMINE 15 MG/ML
15 INJECTION, SOLUTION INTRAMUSCULAR; INTRAVENOUS EVERY 6 HOURS
Status: DISCONTINUED | OUTPATIENT
Start: 2020-03-09 | End: 2020-03-10 | Stop reason: HOSPADM

## 2020-03-09 RX ORDER — DIPHENHYDRAMINE HCL 25 MG
25 CAPSULE ORAL
Status: DISCONTINUED | OUTPATIENT
Start: 2020-03-09 | End: 2020-03-10 | Stop reason: HOSPADM

## 2020-03-09 RX ORDER — SODIUM CHLORIDE, SODIUM LACTATE, POTASSIUM CHLORIDE, CALCIUM CHLORIDE 600; 310; 30; 20 MG/100ML; MG/100ML; MG/100ML; MG/100ML
125 INJECTION, SOLUTION INTRAVENOUS CONTINUOUS
Status: DISCONTINUED | OUTPATIENT
Start: 2020-03-09 | End: 2020-03-10

## 2020-03-09 RX ORDER — DOCUSATE SODIUM 100 MG/1
100 CAPSULE, LIQUID FILLED ORAL 2 TIMES DAILY
Qty: 60 CAP | Refills: 2 | Status: SHIPPED | OUTPATIENT
Start: 2020-03-09 | End: 2020-06-07

## 2020-03-09 RX ORDER — NITROFURANTOIN 25; 75 MG/1; MG/1
100 CAPSULE ORAL 2 TIMES DAILY
Qty: 14 CAP | Refills: 0 | Status: SHIPPED | OUTPATIENT
Start: 2020-03-09 | End: 2020-03-16

## 2020-03-09 RX ORDER — HYDROMORPHONE HYDROCHLORIDE 2 MG/ML
0.5 INJECTION, SOLUTION INTRAMUSCULAR; INTRAVENOUS; SUBCUTANEOUS
Status: COMPLETED | OUTPATIENT
Start: 2020-03-09 | End: 2020-03-09

## 2020-03-09 RX ORDER — TAMSULOSIN HYDROCHLORIDE 0.4 MG/1
0.4 CAPSULE ORAL DAILY
Status: DISCONTINUED | OUTPATIENT
Start: 2020-03-09 | End: 2020-03-10 | Stop reason: HOSPADM

## 2020-03-09 RX ORDER — ACETAMINOPHEN 500 MG
1000 TABLET ORAL EVERY 8 HOURS
Status: DISCONTINUED | OUTPATIENT
Start: 2020-03-09 | End: 2020-03-10 | Stop reason: HOSPADM

## 2020-03-09 RX ORDER — FENTANYL CITRATE 50 UG/ML
25 INJECTION, SOLUTION INTRAMUSCULAR; INTRAVENOUS
Status: DISCONTINUED | OUTPATIENT
Start: 2020-03-09 | End: 2020-03-09 | Stop reason: HOSPADM

## 2020-03-09 RX ORDER — OXYCODONE HYDROCHLORIDE 5 MG/1
5 TABLET ORAL
Status: DISCONTINUED | OUTPATIENT
Start: 2020-03-09 | End: 2020-03-10 | Stop reason: HOSPADM

## 2020-03-09 RX ORDER — SODIUM CHLORIDE, SODIUM LACTATE, POTASSIUM CHLORIDE, CALCIUM CHLORIDE 600; 310; 30; 20 MG/100ML; MG/100ML; MG/100ML; MG/100ML
75 INJECTION, SOLUTION INTRAVENOUS CONTINUOUS
Status: DISCONTINUED | OUTPATIENT
Start: 2020-03-09 | End: 2020-03-09 | Stop reason: HOSPADM

## 2020-03-09 RX ORDER — SODIUM CHLORIDE 0.9 % (FLUSH) 0.9 %
5-40 SYRINGE (ML) INJECTION AS NEEDED
Status: DISCONTINUED | OUTPATIENT
Start: 2020-03-09 | End: 2020-03-10 | Stop reason: HOSPADM

## 2020-03-09 RX ORDER — DEXTROSE 50 % IN WATER (D50W) INTRAVENOUS SYRINGE
25-50 AS NEEDED
Status: DISCONTINUED | OUTPATIENT
Start: 2020-03-09 | End: 2020-03-09 | Stop reason: HOSPADM

## 2020-03-09 RX ORDER — TRAMADOL HYDROCHLORIDE 50 MG/1
50 TABLET ORAL
Qty: 20 TAB | Refills: 0 | Status: SHIPPED | OUTPATIENT
Start: 2020-03-09 | End: 2020-03-14

## 2020-03-09 RX ORDER — LORAZEPAM 2 MG/ML
1 INJECTION INTRAMUSCULAR
Status: DISCONTINUED | OUTPATIENT
Start: 2020-03-09 | End: 2020-03-10 | Stop reason: HOSPADM

## 2020-03-09 RX ORDER — MAGNESIUM SULFATE 100 %
4 CRYSTALS MISCELLANEOUS AS NEEDED
Status: DISCONTINUED | OUTPATIENT
Start: 2020-03-09 | End: 2020-03-09 | Stop reason: HOSPADM

## 2020-03-09 RX ORDER — LIDOCAINE HYDROCHLORIDE 20 MG/ML
INJECTION, SOLUTION EPIDURAL; INFILTRATION; INTRACAUDAL; PERINEURAL AS NEEDED
Status: DISCONTINUED | OUTPATIENT
Start: 2020-03-09 | End: 2020-03-09 | Stop reason: HOSPADM

## 2020-03-09 RX ORDER — PHENAZOPYRIDINE HYDROCHLORIDE 200 MG/1
200 TABLET, FILM COATED ORAL
Status: DISCONTINUED | OUTPATIENT
Start: 2020-03-09 | End: 2020-03-10 | Stop reason: HOSPADM

## 2020-03-09 RX ADMIN — HYDROMORPHONE HYDROCHLORIDE 0.5 MG: 2 INJECTION, SOLUTION INTRAMUSCULAR; INTRAVENOUS; SUBCUTANEOUS at 10:13

## 2020-03-09 RX ADMIN — GABAPENTIN 300 MG: 300 CAPSULE ORAL at 22:15

## 2020-03-09 RX ADMIN — HYDROMORPHONE HYDROCHLORIDE 0.5 MG: 2 INJECTION, SOLUTION INTRAMUSCULAR; INTRAVENOUS; SUBCUTANEOUS at 10:04

## 2020-03-09 RX ADMIN — OXYCODONE HYDROCHLORIDE 5 MG: 5 TABLET ORAL at 20:18

## 2020-03-09 RX ADMIN — KETOROLAC TROMETHAMINE 15 MG: 15 INJECTION, SOLUTION INTRAMUSCULAR; INTRAVENOUS at 17:56

## 2020-03-09 RX ADMIN — Medication 100 MCG: at 07:45

## 2020-03-09 RX ADMIN — AMPICILLIN SODIUM 2 G: 2 INJECTION, POWDER, FOR SOLUTION INTRAMUSCULAR; INTRAVENOUS at 07:30

## 2020-03-09 RX ADMIN — SODIUM CHLORIDE, SODIUM LACTATE, POTASSIUM CHLORIDE, AND CALCIUM CHLORIDE 125 ML/HR: 600; 310; 30; 20 INJECTION, SOLUTION INTRAVENOUS at 22:16

## 2020-03-09 RX ADMIN — OXYCODONE HYDROCHLORIDE 5 MG: 5 TABLET ORAL at 16:18

## 2020-03-09 RX ADMIN — KETOROLAC TROMETHAMINE 15 MG: 15 INJECTION, SOLUTION INTRAMUSCULAR; INTRAVENOUS at 10:55

## 2020-03-09 RX ADMIN — GABAPENTIN 300 MG: 300 CAPSULE ORAL at 12:31

## 2020-03-09 RX ADMIN — Medication 100 MCG: at 07:49

## 2020-03-09 RX ADMIN — MIDAZOLAM HYDROCHLORIDE 2 MG: 2 INJECTION, SOLUTION INTRAMUSCULAR; INTRAVENOUS at 07:30

## 2020-03-09 RX ADMIN — ACETAMINOPHEN 1000 MG: 500 TABLET ORAL at 16:18

## 2020-03-09 RX ADMIN — GLYCOPYRROLATE 0.2 MG: 0.2 INJECTION INTRAMUSCULAR; INTRAVENOUS at 07:30

## 2020-03-09 RX ADMIN — ONDANSETRON 4 MG: 2 INJECTION INTRAMUSCULAR; INTRAVENOUS at 09:27

## 2020-03-09 RX ADMIN — HYDROMORPHONE HYDROCHLORIDE 0.5 MG: 2 INJECTION, SOLUTION INTRAMUSCULAR; INTRAVENOUS; SUBCUTANEOUS at 10:35

## 2020-03-09 RX ADMIN — ATORVASTATIN CALCIUM 40 MG: 40 TABLET, FILM COATED ORAL at 22:15

## 2020-03-09 RX ADMIN — HYDROMORPHONE HYDROCHLORIDE 0.5 MG: 2 INJECTION, SOLUTION INTRAMUSCULAR; INTRAVENOUS; SUBCUTANEOUS at 10:24

## 2020-03-09 RX ADMIN — GLYCOPYRROLATE 0.4 MG: 0.2 INJECTION INTRAMUSCULAR; INTRAVENOUS at 09:30

## 2020-03-09 RX ADMIN — GABAPENTIN 300 MG: 300 CAPSULE ORAL at 17:58

## 2020-03-09 RX ADMIN — FAMOTIDINE 20 MG: 20 TABLET, FILM COATED ORAL at 06:35

## 2020-03-09 RX ADMIN — OXYBUTYNIN CHLORIDE 5 MG: 5 TABLET ORAL at 11:00

## 2020-03-09 RX ADMIN — FENTANYL CITRATE 25 MCG: 50 INJECTION, SOLUTION INTRAMUSCULAR; INTRAVENOUS at 11:15

## 2020-03-09 RX ADMIN — PHENAZOPYRIDINE 200 MG: 200 TABLET ORAL at 17:56

## 2020-03-09 RX ADMIN — PHENAZOPYRIDINE 200 MG: 200 TABLET ORAL at 10:59

## 2020-03-09 RX ADMIN — TAMSULOSIN HYDROCHLORIDE 0.4 MG: 0.4 CAPSULE ORAL at 12:31

## 2020-03-09 RX ADMIN — LIDOCAINE HYDROCHLORIDE 60 MG: 20 INJECTION, SOLUTION EPIDURAL; INFILTRATION; INTRACAUDAL; PERINEURAL at 07:40

## 2020-03-09 RX ADMIN — ROCURONIUM BROMIDE 5 MG: 10 INJECTION, SOLUTION INTRAVENOUS at 07:40

## 2020-03-09 RX ADMIN — FENTANYL CITRATE 50 MCG: 50 INJECTION INTRAMUSCULAR; INTRAVENOUS at 07:40

## 2020-03-09 RX ADMIN — PROPOFOL 170 MG: 10 INJECTION, EMULSION INTRAVENOUS at 07:40

## 2020-03-09 RX ADMIN — Medication 100 MCG: at 08:53

## 2020-03-09 RX ADMIN — Medication 100 MCG: at 09:18

## 2020-03-09 RX ADMIN — FENTANYL CITRATE 25 MCG: 50 INJECTION, SOLUTION INTRAMUSCULAR; INTRAVENOUS at 10:45

## 2020-03-09 RX ADMIN — SUCCINYLCHOLINE CHLORIDE 120 MG: 20 INJECTION, SOLUTION INTRAMUSCULAR; INTRAVENOUS at 07:40

## 2020-03-09 RX ADMIN — FENTANYL CITRATE 25 MCG: 50 INJECTION, SOLUTION INTRAMUSCULAR; INTRAVENOUS at 11:29

## 2020-03-09 RX ADMIN — OXYCODONE HYDROCHLORIDE 5 MG: 5 TABLET ORAL at 12:31

## 2020-03-09 RX ADMIN — DOCUSATE SODIUM 100 MG: 100 CAPSULE, LIQUID FILLED ORAL at 17:57

## 2020-03-09 RX ADMIN — Medication 10 ML: at 22:16

## 2020-03-09 RX ADMIN — PROPOFOL 30 MG: 10 INJECTION, EMULSION INTRAVENOUS at 08:27

## 2020-03-09 RX ADMIN — ROCURONIUM BROMIDE 25 MG: 10 INJECTION, SOLUTION INTRAVENOUS at 07:59

## 2020-03-09 RX ADMIN — FENTANYL CITRATE 50 MCG: 50 INJECTION INTRAMUSCULAR; INTRAVENOUS at 08:41

## 2020-03-09 RX ADMIN — Medication 2.5 MG: at 09:30

## 2020-03-09 RX ADMIN — SODIUM CHLORIDE, SODIUM LACTATE, POTASSIUM CHLORIDE, AND CALCIUM CHLORIDE 75 ML/HR: 600; 310; 30; 20 INJECTION, SOLUTION INTRAVENOUS at 06:25

## 2020-03-09 NOTE — ANESTHESIA PREPROCEDURE EVALUATION
Relevant Problems   No relevant active problems       Anesthetic History   No history of anesthetic complications            Review of Systems / Medical History  Patient summary reviewed, nursing notes reviewed and pertinent labs reviewed    Pulmonary  Within defined limits                 Neuro/Psych   Within defined limits           Cardiovascular    Hypertension              Exercise tolerance: >4 METS     GI/Hepatic/Renal     GERD           Endo/Other    Diabetes         Other Findings   Comments: Neuropathy, chronic pain           Physical Exam    Airway  Mallampati: II  TM Distance: 4 - 6 cm  Neck ROM: normal range of motion   Mouth opening: Normal     Cardiovascular  Regular rate and rhythm,  S1 and S2 normal,  no murmur, click, rub, or gallop  Rhythm: regular  Rate: normal         Dental  No notable dental hx       Pulmonary  Breath sounds clear to auscultation               Abdominal  GI exam deferred       Other Findings            Anesthetic Plan    ASA: 3  Anesthesia type: general          Induction: Intravenous  Anesthetic plan and risks discussed with: Patient

## 2020-03-09 NOTE — ANESTHESIA POSTPROCEDURE EVALUATION
Procedure(s):  LEFT PERCUTANEOUS NEPHROSTOMY TUBE PLACEMENT/NEPHROSTOLITHOTOMY. general    Anesthesia Post Evaluation      Multimodal analgesia: multimodal analgesia used between 6 hours prior to anesthesia start to PACU discharge  Patient location during evaluation: bedside  Patient participation: complete - patient participated  Level of consciousness: awake  Pain management: adequate  Airway patency: patent  Anesthetic complications: no  Cardiovascular status: stable  Respiratory status: acceptable  Hydration status: acceptable  Post anesthesia nausea and vomiting:  controlled      Vitals Value Taken Time   BP 84/49 3/9/2020  9:56 AM   Temp 35.2 °C (95.4 °F) 3/9/2020  9:56 AM   Pulse 94 3/9/2020  9:58 AM   Resp 9 3/9/2020  9:58 AM   SpO2 100 % 3/9/2020  9:58 AM   Vitals shown include unvalidated device data.

## 2020-03-09 NOTE — PERIOP NOTES
TRANSFER - OUT REPORT:    Verbal report given to [facility] AMY RN(name) on Britta Fierro  being transferred to [address] [place] [address](unit) for routine post - op       Report consisted of patients Situation, Background, Assessment and   Recommendations(SBAR). Information from the following report(s) SBAR, Kardex, OR Summary, Procedure Summary, Intake/Output, MAR, Recent Results and Med Rec Status was reviewed with the receiving nurse. Lines:   Peripheral IV 03/09/20 Right Forearm (Active)   Site Assessment Clean, dry, & intact 3/9/2020  9:50 AM   Phlebitis Assessment 0 3/9/2020  9:50 AM   Infiltration Assessment 0 3/9/2020  9:50 AM   Dressing Status Clean, dry, & intact 3/9/2020  9:50 AM   Dressing Type Transparent 3/9/2020  9:50 AM   Hub Color/Line Status Pink 3/9/2020  9:50 AM        Opportunity for questions and clarification was provided.       Patient transported with:   [employer]   Discharge Prescriptions

## 2020-03-09 NOTE — PROGRESS NOTES
conducted an initial consultation and Spiritual Assessment for HENRY Goodman, who is a 62 y.o.,male. Patient's Primary Language is: [place]. According to the patient's EMR [place] Affiliation is: No preference. The reason the Patient came to the hospital is:   Patient Active Problem List    Diagnosis Date Noted    Kidney calculus 03/09/2020    Kidney stone 12/26/2019    Hypovolemic shock ([facility] [address] 12/19/2015    New onset type 2 diabetes mellitus ([facility] [address] 12/19/2015    Migraine 12/19/2015    GERD (gastroesophageal reflux disease) 12/19/2015    Insomnia 12/19/2015    Sinus tachycardia 12/19/2015    Elevated alanine aminotransferase (ALT) level 12/19/2015    Hyponatremia 12/19/2015    Elevated lactic acid level 12/19/2015        The  provided the following Interventions:  Initiated a relationship of care and support. Explored issues of hilary, belief, spirituality and Baptism/ritual needs while hospitalized. Listened empathically. Provided chaplaincy education. Provided information about Spiritual Care Services. Offered assurance of continued prayers on patient's behalf. Chart reviewed. The following outcomes where achieved:  Patient shared limited information about both their medical narrative and spiritual journey/beliefs. Patient processed feeling about current hospitalization. Patient expressed gratitude for 's visit. Assessment:  Patient does not have any Baptism/cultural needs that will affect patient's preferences in health care. There are no spiritual or Baptism issues which require intervention at this time. Plan:  Chaplains will continue to follow and will provide pastoral care on an as needed/requested basis.  recommends bedside caregivers page  on duty if patient shows signs of acute spiritual or emotional distress. Chaplain Chyna MCQUEEN  [address] [facility]   [phone]

## 2020-03-09 NOTE — INTERVAL H&P NOTE
H&P Update:  Michael Acosta was seen and examined. History and physical has been reviewed. The patient has been examined.  There have been no significant clinical changes since the completion of the originally dated History and Physical.

## 2020-03-09 NOTE — INTERVAL H&P NOTE
H&P Update: 
Michael Acosta was seen and examined. History and physical has been reviewed. The patient has been examined. There have been no significant clinical changes since the completion of the originally dated History and Physical. 
 
No interval change Plan to start with Right side instead of left, higher chance of getting complete clearance in single stage on that side. Understands rationale. Right side marked

## 2020-03-09 NOTE — PROGRESS NOTES
Problem: Falls - Risk of  Goal: *Absence of Falls  Description  Document Abel Woodward Fall Risk and appropriate interventions in the flowsheet.   Outcome: Progressing Towards Goal  Note: Fall Risk Interventions:  Mobility Interventions: Patient to call before getting OOB         Medication Interventions: Patient to call before getting OOB, Teach patient to arise slowly    Elimination Interventions: Call light in reach, Patient to call for help with toileting needs, Urinal in reach              Problem: Patient Education: Go to Patient Education Activity  Goal: Patient/Family Education  Outcome: Progressing Towards Goal     Problem: Pain  Goal: *Control of Pain  Outcome: Progressing Towards Goal     Problem: Patient Education: Go to Patient Education Activity  Goal: Patient/Family Education  Outcome: Progressing Towards Goal     Problem: Patient Education: Go to Patient Education Activity  Goal: Patient/Family Education  Outcome: Progressing Towards Goal

## 2020-03-09 NOTE — OP NOTES
Operative Note  Patient: Navjot Piña               Sex: male             MRN: [id number]  YOB: 1955      Age:  62 y.o. Preoperative Diagnosis: Kidney stone [N20.0]  Postoperative Diagnosis:  Kidney stone [N20.0]    Surgeon: Davie Chris MD    Fellow:  Sudha Jeronimo MD    Indication: This is a 62year old male with a partial staghorn stone involving the lower pole calyces about 2.5 cm in dimension. After discussion of all management options, he elects to proceed with a left PCNL. Procedure:    1) Cystoscopy  2) Left flexible ureteroscopy    3) Surgeon acquired percutaneous left renal access   4) Left percutaneous nephrolithotomy > 2 cm  5) Left ureteral stent placement     Findings:    1). Cystoscopy with small middle lobe prostate, 2+ bladder trabeculations  2)  Ureteroscopy revealed ureter was clear, large branching stone burden involving the lower pole calyces  3). Two stick Percutaneous renal access obtained via left lower pole calyx  4). Total stone burden 2.5 cm, visually completely cleared   5). 7F x 26 cm JJ stent placed    Narrative of events: The patient was brought to the operative suite. Anesthesia was induced and preoperative antibiotics were administered. The were prepped in split leg prone position with access to the urethra and to the flank. After appropriate pausea nd confirmation of antibiotic administration, cystoscopy was performed. Prostate was trilobar and mildly obstructing with a small intravesical middle lobe. Bladder was mildly trabeculated, otherwise normal appearing. The left ureteral orifice was identified and cannulated with a 0.035 sensor wire. A duel lumen was advanced and retrogarde pyelogram performed revealing no hydronephrosis, delicate intrarenal collecting system, filling defect in the lower pole corresponding with large radioopaque stone filling the lower pole calyces and infundibula.   Second wire was advanced and an access sheath was advanced to the proximal ureter. Flexible ureteroscopy was then performed. The ureter was clear, the stone burden was seen involving the lower pole. Bubbles were introduced into the system. A suitable lower pole calyx was identified that would be adequate for access. Bulls eye technique was utilized to enter the calyx in two sticks. This was observed both fluoroscopically and endoscopically. A Bentson wire was advanced and grasped with a basket with the flexible ureteroscopy and brought out of the urethra thus obtaining through and through access. A Tiger tail catheter was advanced over the wire all the way into the bladder and out the urethra to then exchange the wire for a super stiff. The balloon was advanced over the super stiff and confirmed just at the point of entry into the calyx endoscopically. The balloon was inflated and a 30 fr sheath was advanced over the balloon which was removed and rigid nephroscope was advanced. The ultrasonic lithotriptor was used to break up all the stone and remove it. Kaylan Thelmay was sent for analysis and culture. Flexible nephroscopy was then performed to ensure no residual fragments. Retrograde flexible ureteroscopy was then performed to clear the ureter. A JJ stent was then advanced over the wire and deployed under direct fluoroscopic guidance in the renal pelvis and in the bladder. The sheath was removed and there was minimal bleeding from the puncture site. The subcutaneous tissues and skin were closed with 2-0 Vicryl and 3-0 Monocryl respectively. 20 cc of 1% Lidocaine were used to perform intercostal block of 12th rib and infiltration of the skin. Patient was awoken from anesthesia and tolerated the procedure well. He was transferred to the recovery room in stable condition. Estimated Blood Loss: 50 CC     Anesthesia:  General                  Implants:   Implant Name Type Inv.  Item Serial No.  Lot No. LRB No. Used Kriss Soto POLARIS 7FR Keturah Escobar [id number]  Formerly [facility] POLARIS 7FR 26CM -- PERCUFLEX  [place] [employer] Charlie Reddish [id number] Left 1 Implanted       Specimens:   ID Type Source Tests Collected by Time Destination   1 : left kidney stone for analysis Stone Kidney, Left  Jens Pelayo MD 3/9/2020  8:57 AM Pathology   1 : left kidney stone for CULTURE Tissue STONES CULTURE, ANAEROBIC, CULTURE, TISSUE W Jay Troncoso MD 3/9/2020  8:59 AM Microbiology        Complications:  None    Disposition:  Admission to the floor for observation, CT scan in the AM, second stage ureteroscopy versus office stent removal depending on results of CT.       Sheryl Mcpherson MD  3/9/2020    I was present and scrubbed for the entirety of the procedure     Dr. Elwood Primrose

## 2020-03-10 ENCOUNTER — APPOINTMENT (OUTPATIENT)
Dept: CT IMAGING | Age: 65
End: 2020-03-10
Attending: UROLOGY
Payer: COMMERCIAL

## 2020-03-10 VITALS
RESPIRATION RATE: 18 BRPM | OXYGEN SATURATION: 93 % | SYSTOLIC BLOOD PRESSURE: 109 MMHG | TEMPERATURE: 98.2 F | HEART RATE: 89 BPM | DIASTOLIC BLOOD PRESSURE: 61 MMHG | WEIGHT: 187 LBS | BODY MASS INDEX: 24 KG/M2 | HEIGHT: 74 IN

## 2020-03-10 LAB
ANION GAP SERPL CALC-SCNC: 3 MMOL/L (ref 3–18)
BASOPHILS # BLD: 0 K/UL (ref 0–0.1)
BASOPHILS NFR BLD: 1 % (ref 0–2)
BUN SERPL-MCNC: 7 MG/DL (ref 7–18)
BUN/CREAT SERPL: 6 (ref 12–20)
CALCIUM SERPL-MCNC: 8 MG/DL (ref 8.5–10.1)
CHLORIDE SERPL-SCNC: 103 MMOL/L (ref 100–111)
CO2 SERPL-SCNC: 32 MMOL/L (ref 21–32)
CREAT SERPL-MCNC: 1.23 MG/DL (ref 0.6–1.3)
DIFFERENTIAL METHOD BLD: ABNORMAL
EOSINOPHIL # BLD: 0.2 K/UL (ref 0–0.4)
EOSINOPHIL NFR BLD: 4 % (ref 0–5)
ERYTHROCYTE [DISTWIDTH] IN BLOOD BY AUTOMATED COUNT: 11.8 % (ref 11.6–14.5)
GLUCOSE BLD STRIP.AUTO-MCNC: 103 MG/DL (ref 70–110)
GLUCOSE BLD STRIP.AUTO-MCNC: 115 MG/DL (ref 70–110)
GLUCOSE SERPL-MCNC: 93 MG/DL (ref 74–99)
HCT VFR BLD AUTO: 29.9 % (ref 36–48)
HGB BLD-MCNC: 10.1 G/DL (ref 13–16)
LYMPHOCYTES # BLD: 1.5 K/UL (ref 0.9–3.6)
LYMPHOCYTES NFR BLD: 23 % (ref 21–52)
MCH RBC QN AUTO: 32 PG (ref 24–34)
MCHC RBC AUTO-ENTMCNC: 33.8 G/DL (ref 31–37)
MCV RBC AUTO: 94.6 FL (ref 74–97)
MONOCYTES # BLD: 0.4 K/UL (ref 0.05–1.2)
MONOCYTES NFR BLD: 6 % (ref 3–10)
NEUTS SEG # BLD: 4.3 K/UL (ref 1.8–8)
NEUTS SEG NFR BLD: 66 % (ref 40–73)
PLATELET # BLD AUTO: 151 K/UL (ref 135–420)
PMV BLD AUTO: 10.3 FL (ref 9.2–11.8)
POTASSIUM SERPL-SCNC: 4 MMOL/L (ref 3.5–5.5)
RBC # BLD AUTO: 3.16 M/UL (ref 4.7–5.5)
SODIUM SERPL-SCNC: 138 MMOL/L (ref 136–145)
WBC # BLD AUTO: 6.4 K/UL (ref 4.6–13.2)

## 2020-03-10 PROCEDURE — 74011250637 HC RX REV CODE- 250/637: Performed by: UROLOGY

## 2020-03-10 PROCEDURE — 74011250636 HC RX REV CODE- 250/636: Performed by: UROLOGY

## 2020-03-10 PROCEDURE — 36415 COLL VENOUS BLD VENIPUNCTURE: CPT

## 2020-03-10 PROCEDURE — 51798 US URINE CAPACITY MEASURE: CPT

## 2020-03-10 PROCEDURE — 82962 GLUCOSE BLOOD TEST: CPT

## 2020-03-10 PROCEDURE — 85025 COMPLETE CBC W/AUTO DIFF WBC: CPT

## 2020-03-10 PROCEDURE — 74176 CT ABD & PELVIS W/O CONTRAST: CPT

## 2020-03-10 PROCEDURE — 80048 BASIC METABOLIC PNL TOTAL CA: CPT

## 2020-03-10 RX ADMIN — KETOROLAC TROMETHAMINE 15 MG: 15 INJECTION, SOLUTION INTRAMUSCULAR; INTRAVENOUS at 06:11

## 2020-03-10 RX ADMIN — DOCUSATE SODIUM 100 MG: 100 CAPSULE, LIQUID FILLED ORAL at 09:15

## 2020-03-10 RX ADMIN — OXYCODONE HYDROCHLORIDE 5 MG: 5 TABLET ORAL at 07:31

## 2020-03-10 RX ADMIN — SODIUM CHLORIDE, SODIUM LACTATE, POTASSIUM CHLORIDE, AND CALCIUM CHLORIDE 125 ML/HR: 600; 310; 30; 20 INJECTION, SOLUTION INTRAVENOUS at 06:08

## 2020-03-10 RX ADMIN — KETOROLAC TROMETHAMINE 15 MG: 15 INJECTION, SOLUTION INTRAMUSCULAR; INTRAVENOUS at 01:16

## 2020-03-10 RX ADMIN — Medication 10 ML: at 06:18

## 2020-03-10 RX ADMIN — ACETAMINOPHEN 1000 MG: 500 TABLET ORAL at 06:17

## 2020-03-10 RX ADMIN — GABAPENTIN 300 MG: 300 CAPSULE ORAL at 09:16

## 2020-03-10 RX ADMIN — TAMSULOSIN HYDROCHLORIDE 0.4 MG: 0.4 CAPSULE ORAL at 09:16

## 2020-03-10 RX ADMIN — PHENAZOPYRIDINE 200 MG: 200 TABLET ORAL at 09:15

## 2020-03-10 NOTE — DIABETES MGMT
Diabetes Patient/Family Education Record  Factors That  May Influence Patients Ability  to Learn or  Comply with Recommendations   []   Language barrier    []   Cultural needs   []   Motivation    []   Cognitive limitation    []   Physical   [x]   Education    []   Physiological factors   []   Hearing/vision/speaking impairment   []   Episcopal beliefs    []   Financial factors   []  Other:   []  No factors identified at this time. Person Instructed:   [x]   Patient   []   Family   []  Other     Preference for Learning:   [x]   Verbal   []   Written   []  Demonstration     Level of Comprehension & Competence:    [x]  Good                                      [] Fair                                     []  Poor                             []  Needs Reinforcement   [x]  Teachback completed    Education Component:  Seen patient this morning prior to disch to home. [x]  Medication management, including how to administer insulin (if appropriate) and potential medication interactions: Patient reported that he was taken off diabete medications including insulin a few years after he successful achieved diabetes control. He cont to follow recommendations for diet and exercise. He cont to monitor his blood sugar at home. [x]  Nutritional management -obtain usual meal pattern: Yes. Patient reported changing eating habits helped a lot and he cont to follow nutr recomm for serving size/portion control of carbs (starches, fruits, dairy) and by limiting intake of concentrated sweets. [x]  Exercise: Yes. [x]  Signs, symptoms, and treatment of hyperglycemia and hypoglycemia: Yes. [x] Prevention, recognition and treatment of hyperglycemia and hypoglycemia: Yes. [x]  Importance of blood glucose monitoring and how to obtain a blood glucose meter: Yes.   []  Instruction on use of the blood glucose meter   [x]  Discuss the importance of HbA1C monitoring: Yes.  A1c level of 14.6% was reported on 12/19/2015. Patient reported that his last A1c level of 5.4% (2/25/2020). It was done as part of his pre-op lab. []  Sick day guidelines   [x]  Proper use and disposal of lancets, needles, syringes or insulin pens (if appropriate): Yes. [x]  Potential long-term complications (retinopathy, kidney disease, neuropathy, foot care): Yes. [x] Information about whom to contact in case of emergency or for more information: Yes. [x]  Goal:  Patient/family will demonstrate understanding of Diabetes Self Management Skills by:   Plan for post-discharge education or self-management support:    [] Outpatient class schedule provided            [] Patient Declined    [] Scheduled for outpatient classes (date) _______  Verify:  Does patient understand how diabetes medications work? N/A. Patient is no longer on diabetes medication. Does patient know what their most recent A1c is? Yes. Does patient monitor glucose at home? Yes. Does patient have difficulty obtaining diabetes medications or testing supplies? No, he is no longer required to take diabetes medications, but he cont to use BG meter for glycemic monitoring.        Seth Bishop RN [facility]  Pager: [phone]

## 2020-03-10 NOTE — PROGRESS NOTES
Discharge planning    Discharge order noted for today. Met with patient and agreeable to the transition plan today. No needs identified from CM. Transport has been arranged with spouse. Updated bedside RN, Roscoe Whiteside,  to the transition plan.       FERMÍN OlguinN, RN  Pager # [phone]  Care Manager

## 2020-03-10 NOTE — PROGRESS NOTES
Reason for Admission:  Kidney stone [N20.0]  Kidney calculus [N20.0]  Kidney stone [N20.0]                 RRAT Score:    7%           Plan for utilizing home health:    None anticipated                    Likelihood of Readmission:   LOW                         Transition of Care Plan:              Initial assessment completed with patient. Cognitive status of patient: oriented to time, place, person and situation. Face sheet information confirmed:  yes. The patient designates Dora Schwab ([phone]) to participate in his discharge plan and to receive any needed information. This patient lives in a single family home with spouse. Patient is able to navigate steps as needed. Prior to hospitalization, patient was considered to be independent with ADLs/IADLS : yes . Patient has a current ACP document on file: no  The patient's spouse will be available to transport patient home upon discharge. The patient reported no medical equipment available in the home. Patient is not currently active with home health. Patient has not stayed in a skilled nursing facility or rehab. This patient is on dialysis :no    Freedom of choice signed: no.     Currently, the discharge plan is Home with family assistance  CM will continue to monitor for transitional needs. The patient states that he can obtain his medications from the pharmacy, and take his medications as directed. Patient's current insurance is "[employer]"      Care Management Interventions  PCP Verified by CM: Yes  Mode of Transport at Discharge: [address] (CM Consult): Discharge Planning  Discharge Durable Medical Equipment: No  Physical Therapy Consult: No  Occupational Therapy Consult: No  Current Support Network: Lives with Spouse  Confirm Follow Up Transport: Self  Discharge Location  Discharge Placement: Home        RAJEEV Lindsay, RN  Pager # [phone]  Care Manager

## 2020-03-10 NOTE — PROGRESS NOTES
Bedside and Verbal shift change report given to CHRISTIAN Waite (oncoming nurse) by Sirena Todd RN (offgoing nurse). Report included the following information SBAR, Kardex, OR Summary, Procedure Summary, Intake/Output and MAR.

## 2020-03-10 NOTE — ROUTINE PROCESS
Spoke with Alexandra Rutherford regarding residuals . Pt voided 200 with a residual of 242. Pt voided 200 with a residual of 192.  1200_Pt voided 400ml of urine . Dr. Bryant Bowers notified in the or and he stated that it was ok for pt to go. 1300-Pt given discharge instructions. Pt verbalized understanding. Each discharge page verified that it matched pts name. IV d/cd. No signs of infection noted. Pt d/cd to home. Pt stable. Armbands removed and shredded.

## 2020-03-10 NOTE — DISCHARGE SUMMARY
Discharge Summary     Patient ID:  Avni Andrews  [id number]  2 y.o.  1955    Admit Date: 3/9/2020    Discharge Date: 3/10/2020    * Admission Diagnoses: Kidney stone [N20.0]; Kidney calculus [N20.0]; Kidney stone [N20.0]    * Discharge Diagnoses:    Hospital Problems as of 3/10/2020 Date Reviewed: 12/26/2019          Codes Class Noted - Resolved POA    Kidney calculus ICD-10-CM: N20.0  ICD-9-CM: 592.0  3/9/2020 - Present Unknown        Kidney stone ICD-10-CM: N20.0  ICD-9-CM: 592.0  12/26/2019 - Present Unknown               Admission Condition: Good    * Discharge Condition: good    * Procedures: Procedure(s):  LEFT PERCUTANEOUS NEPHROSTOMY TUBE PLACEMENT/NEPHROSTOLITHOTOMY    * Hospital Course:   Normal hospital course for this procedure.  AVSS. Pain well controlled. Urine pink. Passed void trial. CT with no hematoma and no residual stone. PHYSICAL EXAMINATION:   Visit Vitals  /61 (BP 1 Location: Left arm, BP Patient Position: At rest)   Pulse 89   Temp 98.2 °F (36.8 °C)   Resp 18   Ht 6' 2\" (1.88 m)   Wt 187 lb (84.8 kg)   SpO2 93%   BMI 24.01 kg/m²     Constitutional: WDWN, Pleasant and appropriate affect, No acute distress. CV:  No peripheral swelling noted  Respiratory: No respiratory distress or difficulties  Abdomen:  No abdominal masses or tenderness. No CVA tenderness. No inguinal hernias noted. : Flank incisions C/D/I. Neville draining CYU. Skin: No jaundice. Neuro/Psych:  Alert and oriented x 3, affect appropriate. Lymphatic:   No enlarged inguinal lymph nodes.         Consults: None    Significant Diagnostic Studies:       Labs: Results:   Chemistry    Recent Labs     03/10/20  0449   GLU 93      K 4.0      CO2 32   BUN 7   CREA 1.23   CA 8.0*   AGAP 3   BUCR 6*      CBC w/Diff Recent Labs     03/10/20  0449   WBC 6.4   RBC 3.16*   HGB 10.1*   HCT 29.9*      GRANS 66   LYMPH 23   EOS 4      Cultures Recent Labs     03/09/20  0859   CULT PENDING All Micro Results     Procedure Component Value Units Date/Time    CULTURE, TISSUE Karla Sacks STAIN [[id number]] Collected:  03/09/20 0859    Order Status:  Completed Specimen:  Kidney Updated:  03/09/20 3783     Special Requests: --        KIDNEY  STONE       GRAM STAIN WBCS SEEN         NO ORGANISMS SEEN        Culture result: PENDING            Urinalysis Color   Date Value Ref Range Status   12/19/2015 YELLOW   Final     Appearance   Date Value Ref Range Status   12/19/2015 CLEAR   Final     Specific gravity   Date Value Ref Range Status   12/19/2015 <1.005 1.003 - 1.030 Final     pH (UA)   Date Value Ref Range Status   12/19/2015 6.0 5.0 - 8.0   Final     Protein   Date Value Ref Range Status   12/19/2015 NEGATIVE  NEG mg/dL Final     Ketone   Date Value Ref Range Status   12/19/2015 NEGATIVE  NEG mg/dL Final     Bilirubin   Date Value Ref Range Status   12/19/2015 NEGATIVE  NEG   Final     Blood   Date Value Ref Range Status   12/19/2015 NEGATIVE  NEG   Final     Urobilinogen   Date Value Ref Range Status   12/19/2015 0.2 0.2 - 1.0 EU/dL Final     Nitrites   Date Value Ref Range Status   12/19/2015 NEGATIVE  NEG   Final     Leukocyte Esterase   Date Value Ref Range Status   12/19/2015 NEGATIVE  NEG   Final     Potassium   Date Value Ref Range Status   03/10/2020 4.0 3.5 - 5.5 mmol/L Final     Creatinine   Date Value Ref Range Status   03/10/2020 1.23 0.6 - 1.3 MG/DL Final     BUN   Date Value Ref Range Status   03/10/2020 7 7.0 - 18 MG/DL Final      PSA No results for input(s): PSA in the last 72 hours.    Coagulation Lab Results   Component Value Date/Time    Prothrombin time 9.7 02/24/2020 09:33 AM    Prothrombin time 9.7 02/24/2020    INR 0.91 02/24/2020 09:33 AM    INR POC 0.9 02/24/2020    aPTT 26 02/24/2020 09:33 AM    aPTT 26 02/24/2020           * Disposition: Home    Discharge Medications:   Current Discharge Medication List      START taking these medications    Details   nitrofurantoin, macrocrystal-monohydrate, (MACROBID) 100 mg capsule Take 1 Cap by mouth two (2) times a day for 7 days. Start two days prior to the next procedure or stent removal  Qty: 14 Cap, Refills: 0      traMADoL (ULTRAM) 50 mg tablet Take 1 Tab by mouth every six (6) hours as needed for Pain for up to 5 days. Max Daily Amount: 200 mg. Qty: 20 Tab, Refills: 0    Associated Diagnoses: Kidney stone      docusate sodium (COLACE) 100 mg capsule Take 1 Cap by mouth two (2) times a day for 90 days. Qty: 60 Cap, Refills: 2         CONTINUE these medications which have NOT CHANGED    Details   tamsulosin (FLOMAX) 0.4 mg capsule Take 1 Cap by mouth daily (after dinner). Qty: 30 Cap, Refills: 0      oxyCODONE-acetaminophen (PERCOCET) 7.5-325 mg per tablet Take  by mouth every eight (8) hours as needed. cyanocobalamin (VITAMIN B-12) 100 mcg tablet Take 100 mcg by mouth. 4 times a week      gabapentin (NEURONTIN) 300 mg capsule 300 mg four (4) times daily. traZODone (DESYREL) 50 mg tablet Take 50 mg by mouth nightly as needed for Sleep. atorvastatin (LIPITOR) 40 mg tablet Take 1 Tab by mouth nightly. Qty: 30 Tab, Refills: 0      lisinopril (PRINIVIL, ZESTRIL) 20 mg tablet Take 10 mg by mouth daily. Indications: high blood pressure      ergocalciferol (ERGOCALCIFEROL) 50,000 unit capsule Take 50,000 Units by mouth. Once a month. * Follow-up Care/Patient Instructions: Activity: See surgical instructions  Diet: Regular Diet  Wound Care: Keep wound clean and dry  Wound care discussed with patient. Follow-up Information     Follow up With Specialties Details Why [id number] [facility], [facility], [address] [place] [address]   [address]  [address]  [facility] [facility]  [phone]            PCP:  Bernardo Sullivan MD  Referring Provider: No ref. provider found    Follow up David Tovar MD in 14 days.   Follow up with:Dr. Christ Reeder in 7-14 days for cysto/stent removal in the office Signed:  Betzaida Lin MD    [phone]    March 10, 2020 7:55 AM

## 2020-03-10 NOTE — DISCHARGE INSTRUCTIONS
DISCHARGE SUMMARY from Nurse    PATIENT INSTRUCTIONS:    After general anesthesia or intravenous sedation, for 24 hours or while taking prescription Narcotics:  · Limit your activities  · Do not drive and operate hazardous machinery  · Do not make important personal or business decisions  · Do  not drink alcoholic beverages  · If you have not urinated within 8 hours after discharge, please contact your surgeon on call. Report the following to your surgeon:  · Excessive pain, swelling, redness or odor of or around the surgical area  · Temperature over 100.5  · Nausea and vomiting lasting longer than 4 hours or if unable to take medications  · Any signs of decreased circulation or nerve impairment to extremity: change in color, persistent  numbness, tingling, coldness or increase pain  · Any questions    What to do at Home:  Recommended activity: Activity as tolerated,     If you experience any of the following symptoms  Fever, chills, shortness of breath, please follow up with md.    *  Please give a list of your current medications to your Primary Care Provider. *  Please update this list whenever your medications are discontinued, doses are      changed, or new medications (including over-the-counter products) are added. *  Please carry medication information at all times in case of emergency situations. These are general instructions for a healthy lifestyle:    No smoking/ No tobacco products/ Avoid exposure to second hand smoke  Surgeon General's Warning:  Quitting smoking now greatly reduces serious risk to your health.     Obesity, smoking, and sedentary lifestyle greatly increases your risk for illness    A healthy diet, regular physical exercise & weight monitoring are important for maintaining a healthy lifestyle    You may be retaining fluid if you have a history of heart failure or if you experience any of the following symptoms:  Weight gain of 3 pounds or more overnight or 5 pounds in a week, increased swelling in our hands or feet or shortness of breath while lying flat in bed. Please call your doctor as soon as you notice any of these symptoms; do not wait until your next office visit. The discharge information has been reviewed with the patient. The patient verbalized understanding. Discharge medications reviewed with the patient and appropriate educational materials and side effects teaching were provided.   ___________________________________________________________________________________________________________________________________      Discharge Instructions      Patient: Silvio Patel               Sex: male          DOA: 3/9/2020         YOB: 1955      Age:  62 y.o.        LOS:  LOS: 0 days     ACUTE DIAGNOSES:  Nephrolithiasis     CHRONIC MEDICAL DIAGNOSES:  Problem List as of 3/9/2020 Date Reviewed: 12/26/2019          Codes Class Noted - Resolved    Kidney calculus ICD-10-CM: N20.0  ICD-9-CM: 592.0  3/9/2020 - Present        Kidney stone ICD-10-CM: N20.0  ICD-9-CM: 592.0  12/26/2019 - Present        Hypovolemic shock ([facility] ICD-10-CM: R57.1  ICD-9-CM: 785.59  12/19/2015 - Present        New onset type 2 diabetes mellitus ([facility] ICD-10-CM: E11.9  ICD-9-CM: 250.00  12/19/2015 - Present        Migraine ICD-10-CM: W59.560  ICD-9-CM: 346.90  12/19/2015 - Present        GERD (gastroesophageal reflux disease) ICD-10-CM: K21.9  ICD-9-CM: 530.81  12/19/2015 - Present        Insomnia ICD-10-CM: G47.00  ICD-9-CM: 780.52  12/19/2015 - Present        Sinus tachycardia ICD-10-CM: R00.0  ICD-9-CM: 427.89  12/19/2015 - Present        Elevated alanine aminotransferase (ALT) level ICD-10-CM: R74.0  ICD-9-CM: 790.4  12/19/2015 - Present        Hyponatremia ICD-10-CM: E87.1  ICD-9-CM: 276.1  12/19/2015 - Present        Elevated lactic acid level ICD-10-CM: R79.89  ICD-9-CM: 276.2  12/19/2015 - Present              ACTIVITY: No heavy lifting for 1 week or until urine is clear, whichever is longer. ADDITIONAL INFORMATION:   You have indwelling ureteral stents which frequently causes slight discomfort in the flank region and bladder spasms. You can take flomax as needed for flank discomfort or Ditropan for bladder spasms. The indwelling stent will need to be removed/exchanged as directed below. If the stent is left in place without appropriate follow-up, it may become encrusted with stone and/or infected. If that occurs, you will require multiple additional surgeries to fix this. It is very important you follow up for appropriate removal or exchange of ureteral stents. If you experience any fevers > 100.4, significant nausea/vomiting, or significant worsening of pain, please contact us at the number below. It is common to experience mild, recurrent blood in your urine and this is likely due to stent irritation. The general trend should be decreasing bleeding with occasional flares due to increased activity. If the bleeding continues to worsen with passage of clots, you are unable to urinate, or you experience significant light-headedness, please contact us at the number below. If you develop new bleeding after a period of clear urine and this is severe, particularly if this is around 10 days after surgery, please contact our office immediately. If the bleeding is severe with clots or you feel lightheaded, please proceed immediately to the closest Emergency department. You may resume showering but do not directly scrub the incision. You may resume bathing in two week. Please inspect your incision daily, looking for signs of infection (increasing/spreading redness, swelling, drainage). Keep the incision clean and dry. Should urine leakage persist beyond one week, please contact [facility]. FOLLOW UP CARE:  To be determined based on CT Results.

## 2020-03-10 NOTE — PROGRESS NOTES
AOx4, no apparent distress. Educated about the importance  and proper use of incentive spirometer. Teaching given about benefits of ambulation and the risk of non compliance. Patient verbalized understanding. Call bell within reach and bed in low position.

## 2020-03-13 LAB
BACTERIA SPEC ANAEROBE CULT: NORMAL
BACTERIA SPEC CULT: NORMAL
GRAM STN SPEC: NORMAL
GRAM STN SPEC: NORMAL
SERVICE CMNT-IMP: NORMAL
SPECIMEN SOURCE: NORMAL

## 2020-03-14 LAB
BACTERIA SPEC CULT: NORMAL
SOURCE, RSRC56: NORMAL

## 2020-03-24 LAB
CALCIUM OXALATE DIHYDRATE MFR STONE IR: 50 %
COLOR STONE: NORMAL
COM MFR STONE: 50 %
COMMENT, 519994: NORMAL
COMPOSITION, 120440: NORMAL
DISCLAIMER, STO32L: NORMAL
PHOTO, 120675: NORMAL
PLEASE NOTE, 130422: NORMAL
SIZE STONE: NORMAL MM
SPECIMEN SOURCE: NORMAL
WT STONE: 1258.6 MG

## 2021-05-24 ENCOUNTER — OFFICE VISIT (OUTPATIENT)
Dept: ORTHOPEDIC SURGERY | Age: 66
End: 2021-05-24
Payer: MEDICARE

## 2021-05-24 VITALS
TEMPERATURE: 97.8 F | OXYGEN SATURATION: 100 % | WEIGHT: 186 LBS | HEART RATE: 89 BPM | HEIGHT: 74 IN | BODY MASS INDEX: 23.87 KG/M2

## 2021-05-24 DIAGNOSIS — Z87.81 HX OF COMPRESSION FRACTURE OF SPINE: ICD-10-CM

## 2021-05-24 DIAGNOSIS — M50.30 DDD (DEGENERATIVE DISC DISEASE), CERVICAL: Primary | ICD-10-CM

## 2021-05-24 PROCEDURE — G8536 NO DOC ELDER MAL SCRN: HCPCS | Performed by: PHYSICAL MEDICINE & REHABILITATION

## 2021-05-24 PROCEDURE — G8420 CALC BMI NORM PARAMETERS: HCPCS | Performed by: PHYSICAL MEDICINE & REHABILITATION

## 2021-05-24 PROCEDURE — 72040 X-RAY EXAM NECK SPINE 2-3 VW: CPT | Performed by: PHYSICAL MEDICINE & REHABILITATION

## 2021-05-24 PROCEDURE — G8432 DEP SCR NOT DOC, RNG: HCPCS | Performed by: PHYSICAL MEDICINE & REHABILITATION

## 2021-05-24 PROCEDURE — 3017F COLORECTAL CA SCREEN DOC REV: CPT | Performed by: PHYSICAL MEDICINE & REHABILITATION

## 2021-05-24 PROCEDURE — 1101F PT FALLS ASSESS-DOCD LE1/YR: CPT | Performed by: PHYSICAL MEDICINE & REHABILITATION

## 2021-05-24 PROCEDURE — G8427 DOCREV CUR MEDS BY ELIG CLIN: HCPCS | Performed by: PHYSICAL MEDICINE & REHABILITATION

## 2021-05-24 PROCEDURE — 99203 OFFICE O/P NEW LOW 30 MIN: CPT | Performed by: PHYSICAL MEDICINE & REHABILITATION

## 2021-05-24 NOTE — PROGRESS NOTES
Nelly Culp presents today for   Chief Complaint   Patient presents with    Back Pain    Neck Pain       Is someone accompanying this pt? no    Is the patient using any DME equipment during OV? no      Coordination of Care:  1. Have you been to the ER, urgent care clinic since your last visit? no  Hospitalized since your last visit? no    2. Have you seen or consulted any other health care providers outside of the [address] [place] [address] since your last visit? Yes, urology and pcp Include any pap smears or colon screening.  no

## 2021-05-24 NOTE — PROGRESS NOTES
[address]  [address], [address]  [place], [address] [place] [address] Street  Phone: [phone]  Fax: [phone]        Yuri Arechiga  : 1955  PCP: Madeline Garcia MD    PROGRESS NOTE      ASSESSMENT AND PLAN    Diagnoses and all orders for this visit:    1. DDD (degenerative disc disease), cervical  -     AMB POC XRAY, SPINE, CERVICAL; 2 OR 3    2. Hx of compression fracture of spine, L3         3 28-year-old right-handed retired male with 4 months of mechanical neck pain. He has right upper lumbar with problems. He remains neurologically intact. 2. I am giving him exercises for his neck and low back. 3. We have discussed tech neck and muscle realignment. 4. He may continue to walk but will need to pace himself. 5. OTC NSAIDs with food as needed pain  6. Discussed trigger point injections if not improving. 7. Discussed symptoms of progressive disease including radiculopathy and weakness. .    Follow-up and Dispositions    · Return in about 1 month (around 2021) for spine re-evaluation. HISTORY OF PRESENT ILLNESS  Michael Acosta is seen today for neck and LBP. LV 2020 NP [place], pt asymptomatic. LB only bothers him if he has been walking for a long time. He can usually walk about half a mile before his right neck starts back during. He has a history of L3 compression fracture. Comes in today primarily for neck pain. Insidious onset about 4 months ago. No trauma or inciting event. Neuro headaches or radicular pain. He denies any weakness. He has significant diabetic peripheral neuropathy for which she takes Percocet 4 times daily through his PCP.       Onset of pain: neck 2021. 10/2019 moving mattress, L3 comp fx  Does pain radiate into extremities: No  Does patient have numbness/tingling: Lower extremities  Does patient have weakness: Denies  Denies red flags including: persistent fevers, chills, weight changes, saddle paresthesias, and neurogenic bowel or bladder symptoms. Investigations:   C XR AP/lat 2V 5/24/2021 (I personally reviewed these images): C6-C7 autofusion  2/2020: L3 comp fx, diffuse osteopenia  2016 L MRI mild degen changes L3-S1, renal cyst.    Spine surgery consult: N/A    Treatments:  Physical therapy: None recently  Spinal injections: Neck injection 40 years ago  Spinal surgery-no  Beneficial medications: None  Failed medications: Percocet helps with his neuropathy but not his neck    Work Status: Retired from sales. Pertinent PMHx of L3 comp fx, migraine, GERD, HTN, recurrent renal stones, DM PN requiring chronic Percocet through PCP (previous A1C was 14, now 5.1)    Physical Exam:   Trim middle-aged gentleman in no acute distress. Neck is forward flexed. He has tenderness at his left greater than right C7 paraspinal musculature. Shoulder range of motion is intact. Upper extremity strength intact. DTRs are 1+ upper extremities. Negative Josefa sign. He has some spasm right upper lumbar. No rash or ecchymosis. Ambulation is nonantalgic without assistive device. Pain Assessment  5/24/2021   Location of Pain Back;Neck   Location Modifiers -   Severity of Pain 3   Quality of Pain Other (Comment)   Quality of Pain Comment like a muscle pull in the neck, crunchin pain in the back   Duration of Pain Persistent   Duration of Pain Comment -   Frequency of Pain Constant   Frequency of Pain Comment back pain comes and goes   Aggravating Factors Other (Comment)   Aggravating Factors Comment walking too far- back, moving neck in certain directions   Limiting Behavior Some   Relieving Factors Other (Comment); Rest   Relieving Factors Comment keeping head straight/still   Result of Injury No   Work-Related Injury -   Type of Injury -       Visit Vitals  Pulse 89   Temp 97.8 °F (36.6 °C) (Oral)   Ht 6' 2\" (1.88 m)   Wt 186 lb (84.4 kg)   SpO2 100% Comment: RA   BMI 23.88 kg/m²       Past Medical History: Diagnosis Date    Anxiety     Classic migraine with aura     [facility] ([facility] [address]     no meds    Dizziness     Fatigue     GERD (gastroesophageal reflux disease)     HTN (hypertension)     Insomnia     Kidney stones     Male erectile dysfunction     Neuropathy     chronic    Polydipsia     Polyuria     Vitamin D deficiency         Past Surgical History:   Procedure Laterality Date    HX LITHOTRIPSY      2006, 2008, 2010 and 2013    HX OTHER SURGICAL      small skin cancer removed from left side of face         Current Outpatient Medications   Medication Sig Dispense Refill    tamsulosin (FLOMAX) 0.4 mg capsule TAKE ONE CAPSULE BY MOUTH DAILY AFTER DINNER 90 Cap 3    PreviDent 5000 Plus 1.1 % crea       tamsulosin (FLOMAX) 0.4 mg capsule Take 1 Cap by mouth daily (after dinner). 30 Cap 0    oxyCODONE-acetaminophen (PERCOCET) 7.5-325 mg per tablet Take  by mouth every eight (8) hours as needed.  cyanocobalamin (VITAMIN B-12) 100 mcg tablet Take 100 mcg by mouth. 4 times a week      gabapentin (NEURONTIN) 300 mg capsule 300 mg four (4) times daily.  traZODone (DESYREL) 50 mg tablet Take 50 mg by mouth nightly as needed for Sleep.  ergocalciferol (ERGOCALCIFEROL) 50,000 unit capsule Take 50,000 Units by mouth. Once a month.  atorvastatin (LIPITOR) 40 mg tablet Take 1 Tab by mouth nightly. 30 Tab 0    lisinopril (PRINIVIL, ZESTRIL) 20 mg tablet Take 10 mg by mouth daily. Indications: high blood pressure         Mr. Leavitt Grey Eagle may have a reminder for a \"due or due soon\" health maintenance. I have asked that he contact his primary care provider for follow-up on this health maintenance. This note was created using Dragon transcription software and may contain unintended errors.

## 2021-05-24 NOTE — PATIENT INSTRUCTIONS
Neck Arthritis: Exercises  Introduction  Here are some examples of exercises for you to try. The exercises may be suggested for a condition or for rehabilitation. Start each exercise slowly. Ease off the exercises if you start to have pain. You will be told when to start these exercises and which ones will work best for you. How to do the exercises  Neck stretches to the side   1. This stretch works best if you keep your shoulder down as you lean away from it. To help you remember to do this, start by relaxing your shoulders and lightly holding on to your thighs or your chair. 2. Tilt your head toward your shoulder and hold for 15 to 30 seconds. Let the weight of your head stretch your muscles. 3. Repeat 2 to 4 times toward each shoulder. Chin tuck   1. Lie on the floor with a rolled-up towel under your neck. Your head should be touching the floor. 2. Slowly bring your chin toward your chest.  3. Hold for a count of 6, and then relax for up to 10 seconds. 4. Repeat 8 to 12 times. Active cervical rotation   1. Sit in a firm chair, or stand up straight. 2. Keeping your chin level, turn your head to the right, and hold for 15 to 30 seconds. 3. Turn your head to the left and hold for 15 to 30 seconds. 4. Repeat 2 to 4 times to each side. Shoulder blade squeeze   1. While standing, squeeze your shoulder blades together. 2. Do not raise your shoulders up as you are squeezing. 3. Hold for 6 seconds. 4. Repeat 8 to 12 times. Shoulder rolls   1. Sit comfortably with your feet shoulder-width apart. You can also do this exercise standing up. 2. Roll your shoulders up, then back, and then down in a smooth, circular motion. 3. Repeat 2 to 4 times. Follow-up care is a key part of your treatment and safety. Be sure to make and go to all appointments, and call your doctor if you are having problems. It's also a good idea to know your test results and keep a list of the medicines you take.   Where can you learn more? Go to http://www.gray.com/  Enter V376 in the search box to learn more about \"Neck Arthritis: Exercises. \"  Current as of: November 16, 2020               Content Version: 12.8  © 2006-2021 [employer]. Care instructions adapted under license by [employer] (which disclaims liability or warranty for this information). If you have questions about a medical condition or this instruction, always ask your healthcare professional. [address] any warranty or liability for your use of this information. Low Back Pain: Exercises  Introduction  Here are some examples of exercises for you to try. The exercises may be suggested for a condition or for rehabilitation. Start each exercise slowly. Ease off the exercises if you start to have pain. You will be told when to start these exercises and which ones will work best for you. How to do the exercises  Press-up   1. Lie on your stomach, supporting your body with your forearms. 2. Press your elbows down into the floor to raise your upper back. As you do this, relax your stomach muscles and allow your back to arch without using your back muscles. As your press up, do not let your hips or pelvis come off the floor. 3. Hold for 15 to 30 seconds, then relax. 4. Repeat 2 to 4 times. Alternate arm and leg (bird dog) exercise   Do this exercise slowly. Try to keep your body straight at all times, and do not let one hip drop lower than the other. 1. Start on the floor, on your hands and knees. 2. Tighten your belly muscles. 3. Raise one leg off the floor, and hold it straight out behind you. Be careful not to let your hip drop down, because that will twist your trunk. 4. Hold for about 6 seconds, then lower your leg and switch to the other leg. 5. Repeat 8 to 12 times on each leg. 6. Over time, work up to holding for 10 to 30 seconds each time.   7. If you feel stable and secure with your leg raised, try raising the opposite arm straight out in front of you at the same time. Knee-to-chest exercise   1. Lie on your back with your knees bent and your feet flat on the floor. 2. Bring one knee to your chest, keeping the other foot flat on the floor (or keeping the other leg straight, whichever feels better on your lower back). 3. Keep your lower back pressed to the floor. Hold for at least 15 to 30 seconds. 4. Relax, and lower the knee to the starting position. 5. Repeat with the other leg. Repeat 2 to 4 times with each leg. 6. To get more stretch, put your other leg flat on the floor while pulling your knee to your chest.    Curl-ups   1. Lie on the floor on your back with your knees bent at a 90-degree angle. Your feet should be flat on the floor, about 12 inches from your buttocks. 2. Cross your arms over your chest. If this bothers your neck, try putting your hands behind your neck (not your head), with your elbows spread apart. 3. Slowly tighten your belly muscles and raise your shoulder blades off the floor. 4. Keep your head in line with your body, and do not press your chin to your chest.  5. Hold this position for 1 or 2 seconds, then slowly lower yourself back down to the floor. 6. Repeat 8 to 12 times. Pelvic tilt exercise   1. Lie on your back with your knees bent. 2. \"Brace\" your stomach. This means to tighten your muscles by pulling in and imagining your belly button moving toward your spine. You should feel like your back is pressing to the floor and your hips and pelvis are rocking back. 3. Hold for about 6 seconds while you breathe smoothly. 4. Repeat 8 to 12 times. Heel dig bridging   1. Lie on your back with both knees bent and your ankles bent so that only your heels are digging into the floor. Your knees should be bent about 90 degrees.   2. Then push your heels into the floor, squeeze your buttocks, and lift your hips off the floor until your shoulders, hips, and knees are all in a straight line. 3. Hold for about 6 seconds as you continue to breathe normally, and then slowly lower your hips back down to the floor and rest for up to 10 seconds. 4. Do 8 to 12 repetitions. Hamstring stretch in doorway   1. Lie on your back in a doorway, with one leg through the open door. 2. Slide your leg up the wall to straighten your knee. You should feel a gentle stretch down the back of your leg. 3. Hold the stretch for at least 15 to 30 seconds. Do not arch your back, point your toes, or bend either knee. Keep one heel touching the floor and the other heel touching the wall. 4. Repeat with your other leg. 5. Do 2 to 4 times for each leg. Hip flexor stretch   1. Kneel on the floor with one knee bent and one leg behind you. Place your forward knee over your foot. Keep your other knee touching the floor. 2. Slowly push your hips forward until you feel a stretch in the upper thigh of your rear leg. 3. Hold the stretch for at least 15 to 30 seconds. Repeat with your other leg. 4. Do 2 to 4 times on each side. Wall sit   1. Stand with your back 10 to 12 inches away from a wall. 2. Lean into the wall until your back is flat against it. 3. Slowly slide down until your knees are slightly bent, pressing your lower back into the wall. 4. Hold for about 6 seconds, then slide back up the wall. 5. Repeat 8 to 12 times. Follow-up care is a key part of your treatment and safety. Be sure to make and go to all appointments, and call your doctor if you are having problems. It's also a good idea to know your test results and keep a list of the medicines you take. Where can you learn more? Go to http://www.gray.com/  Enter L187 in the search box to learn more about \"Low Back Pain: Exercises. \"  Current as of: November 16, 2020               Content Version: 12.8  © 6613-8543 [employer].    Care instructions adapted under license by [address] (which disclaims liability or warranty for this information). If you have questions about a medical condition or this instruction, always ask your healthcare professional. [address] any warranty or liability for your use of this information. Learning About Degenerative Disc Disease  What is degenerative disc disease? Degenerative disc disease isn't really a disease. It's a term used to describe the normal changes in your spinal discs as you age. Spinal discs are small, spongy discs that separate the bones (vertebrae) that make up the spine. The discs act as shock absorbers for the spine. They let your spine flex, bend, and twist.  Degenerative disc disease can take place in one or more places along the spine. It most often occurs in the discs in the lower back and the neck. The changes in the discs can cause back and neck pain. They can also lead to osteoarthritis, a herniated disc, or spinal stenosis. What causes it? As we age, our spinal discs break down, or degenerate. This breakdown causes the symptoms of degenerative disc disease in some people. When the discs break down, they can lose fluid and dry out, and their outer layers can have tiny cracks or tears. This leads to less padding and less space between the bones in the spine. The body reacts to this by making bony growths on the spine called bone spurs. These spurs can press on the spinal nerve roots or spinal cord. This can cause pain and can affect how well the nerves work. These changes in the discs are more likely to occur if you smoke, do heavy physical work (such as repeated heavy lifting), or are very overweight. A sudden injury may also cause changes to occur. What are the symptoms? Many people with degenerative disc disease have no pain. But others have severe pain or other symptoms that limit their activities.  Some of the most common symptoms are:  · Pain in the back or neck. Where the pain occurs depends on which discs are affected. · Pain that gets worse when you move, such as when you bend over, reach up, or twist.  · Pain that may occur in the rear end (buttocks), arm, or leg if a nerve is pinched. · Numbness or tingling in your arm or leg. The pain may start after a major injury (such as from a car accident), a minor injury (such as a fall from a low height), or a normal motion (such as bending over to pick something up). It may also start gradually for no known reason and get worse over time. How is it diagnosed? A doctor can often diagnose degenerative disc disease while doing a physical exam. If your exam shows no signs of a serious condition, imaging tests (such as an X-ray) aren't likely to help your doctor find the cause of your symptoms. Sometimes degenerative disc disease is found when an X-ray is taken for another reason, such as an injury or other health problem. But even if the doctor finds degenerative disc disease, that doesn't always mean that you will have symptoms. How is degenerative disc disease treated? Self-care may be all you need to relieve pain caused by disc changes. This may include using ice or heat and taking over-the-counter medicines. Your doctor can prescribe stronger medicines if needed. If you develop health problems such as osteoarthritis, a herniated disc, or spinal stenosis, you may need other treatments. These include physical therapy and exercises for strengthening and stretching the back. In some cases, surgery may be recommended. It usually involves removing the damaged disc. In some cases, the bone is then permanently joined (fused) to protect the spinal cord. In rare cases, an artificial disc may be used to replace the disc that is removed. How can you care for yourself? Here are some things you can do to help manage pain from degenerative disc disease. · Use ice or heat (whichever feels better) on the affected area. ?  Put ice or a cold pack on the area for 10 to 20 minutes at a time. Put a thin cloth between the ice and your skin. ? Put a warm water bottle, a heating pad set on low, or a warm cloth on your back. Put a thin cloth between the heating pad and your skin. Do not go to sleep with a heating pad on your skin. · Ask your doctor if you can take an over-the-counter pain medicine. These include acetaminophen (such as Tylenol) and nonsteroidal anti-inflammatory drugs, such as ibuprofen or naproxen. Your doctor can prescribe stronger medicines if needed. Be safe with medicines. Read and follow all instructions on the label. · Get some exercise every day. Exercise is one of the best ways to help your back feel better and stay better. It's best to start each exercise slowly. You may notice a little soreness, and that's okay. But if an exercise makes your pain worse, stop doing it. Here are things you can try:  ? Walking. It's the simplest and maybe the best activity for your back. It gets your blood moving and helps your muscles stay strong. ? Exercises that gently stretch and strengthen your stomach, back, and leg muscles. The stronger those muscles are, the better they're able to protect your back. Follow-up care is a key part of your treatment and safety. Be sure to make and go to all appointments, and call your doctor if you are having problems. It's also a good idea to know your test results and keep a list of the medicines you take. Where can you learn more? Go to http://www.gray.com/  Enter B768 in the search box to learn more about \"Learning About Degenerative Disc Disease. \"  Current as of: November 16, 2020               Content Version: 12.8  © 7132-4669 [employer]. Care instructions adapted under license by [employer] (which disclaims liability or warranty for this information).  If you have questions about a medical condition or this instruction, always ask your healthcare professional. [place] [address] any warranty or liability for your use of this information.

## 2021-06-24 ENCOUNTER — OFFICE VISIT (OUTPATIENT)
Dept: ORTHOPEDIC SURGERY | Age: 66
End: 2021-06-24
Payer: MEDICARE

## 2021-06-24 VITALS — BODY MASS INDEX: 24.41 KG/M2 | HEART RATE: 75 BPM | WEIGHT: 190.2 LBS | HEIGHT: 74 IN | OXYGEN SATURATION: 96 %

## 2021-06-24 DIAGNOSIS — S32.030S CLOSED COMPRESSION FRACTURE OF L3 LUMBAR VERTEBRA, SEQUELA: Primary | ICD-10-CM

## 2021-06-24 DIAGNOSIS — M47.816 LUMBAR SPONDYLOSIS: ICD-10-CM

## 2021-06-24 DIAGNOSIS — M79.2 PERIPHERAL NEURALGIA: ICD-10-CM

## 2021-06-24 PROCEDURE — G8432 DEP SCR NOT DOC, RNG: HCPCS | Performed by: PHYSICAL MEDICINE & REHABILITATION

## 2021-06-24 PROCEDURE — 1101F PT FALLS ASSESS-DOCD LE1/YR: CPT | Performed by: PHYSICAL MEDICINE & REHABILITATION

## 2021-06-24 PROCEDURE — G8427 DOCREV CUR MEDS BY ELIG CLIN: HCPCS | Performed by: PHYSICAL MEDICINE & REHABILITATION

## 2021-06-24 PROCEDURE — G8420 CALC BMI NORM PARAMETERS: HCPCS | Performed by: PHYSICAL MEDICINE & REHABILITATION

## 2021-06-24 PROCEDURE — 3017F COLORECTAL CA SCREEN DOC REV: CPT | Performed by: PHYSICAL MEDICINE & REHABILITATION

## 2021-06-24 PROCEDURE — G8536 NO DOC ELDER MAL SCRN: HCPCS | Performed by: PHYSICAL MEDICINE & REHABILITATION

## 2021-06-24 PROCEDURE — 99214 OFFICE O/P EST MOD 30 MIN: CPT | Performed by: PHYSICAL MEDICINE & REHABILITATION

## 2021-06-24 RX ORDER — LIDOCAINE 50 MG/G
1-2 PATCH TOPICAL EVERY 12 HOURS
Qty: 60 EACH | Refills: 1 | Status: SHIPPED | OUTPATIENT
Start: 2021-06-24 | End: 2022-03-24

## 2021-06-24 NOTE — PROGRESS NOTES
[address]  [address], [address]  [place], [address] [place] [address] Street  Phone: [phone]  Fax: [phone]      Nancy Suh  : 1955  PCP: Will Clay MD    PROGRESS NOTE    Diagnoses and all orders for this visit:    1. Closed compression fracture of L3 lumbar vertebra, sequela  -     lidocaine (LIDODERM) 5 %; 1-2 Patches by TransDERmal route every twelve (12) hours. Apply patch to the affected area for 12 hours a day and remove for 12 hours a day. 2. Peripheral neuralgia, DM  -     lidocaine (LIDODERM) 5 %; 1-2 Patches by TransDERmal route every twelve (12) hours. Apply patch to the affected area for 12 hours a day and remove for 12 hours a day. 3. Lumbar spondylosis         1. Felisa Bills is a 73 y.o. male with baseline advanced peripheral neuropathy presenting with 6 months of upper lumbar pain. He is still having residual symptoms after he walks 10 to 15 minutes. He  does not have any radiating paresthesias, no lower extremity weakness. 2. Currently I am going to give him a trial of the Lidoderm patch, he may use this prior to his 2 mile walks with his spouse. He can also try this on his feet at bedtime. 3. Continue home exercise and postural stretches for his neck as well as his low back. 4. Patient may call and be scheduled for a lumbar MRI if not improving. Indication is 6 months of upper lumbar pain, hx L3 compression, ambulatory intolerance. Possible injections. Follow-up and Dispositions    · Return if symptoms worsen or fail to improve, for pt may call for L MRI. Pain Assessment  2021   Location of Pain Back;Neck   Location Modifiers -   Severity of Pain 4   Quality of Pain Aching   Quality of Pain Comment -   Duration of Pain -   Duration of Pain Comment -   Frequency of Pain Intermittent   Frequency of Pain Comment -   Aggravating Factors Other (Comment); Walking   Aggravating Factors Comment Moving a certain way   Limiting Behavior Yes   Relieving Factors Rest   Relieving Factors Comment Sitting down   Result of Injury -   Work-Related Injury -   Type of Injury -       History of Present Illness:  Yemi Moffett is a  73 y.o.  male returns today. He reports benefit with doing home exercise for his neck. He wants to be able to walk 2 miles with his spouse. He can go about 10 to 15 minutes before his upper back starts aggravating hand. He denies any sciatica. He has chronic neuropathy in his hands and feet. He takes Percocet 4 times daily for neuropathy through his PCP. Does not denies any constitutional symptoms, no night pain or unintended weight loss. Physical Exam:  Good range of motion, no increased pain with lumbar flexion. Stretching with lateral bending. Lower extremity strength is intact. Straight leg raise is negative. No peripheral edema is noted. Negative Josefa sign      Visit Vitals  Pulse 75   Ht 6' 2\" (1.88 m)   Wt 190 lb 3.2 oz (86.3 kg)   SpO2 96%   BMI 24.42 kg/m²           Pertinent past spine history:  2016 MRI: degenerative changes L3/S1, renal cyst.  2020 x-ray, L3 compression fracture, osteopenia diffusely  2021 cervical x-rays: Degenerative changes, autofusion C6/C7. Pertinent past medical history:  Migraine, GERD, HTN, recurrent renal stones, diabetic peripheral neuropathy requiring chronic Percocet/Gabapentin through PCP since about 2015. Work status:  Retired from sales. We have informed Michael Acosta to notify us for immediate appointment if he has any worsening neurogical symptoms or if an emergency situation presents, then call 911. Unintended errors may be present due to dragon medical dictation software.

## 2021-06-24 NOTE — PROGRESS NOTES
Verbal order entered per Dr. Heather Dennis as documented on blue sheet: lidoderm patch  1-2 topically on for 12 hrs then off for 12 hrs.  Disp#60, 1 RF

## 2021-06-25 ENCOUNTER — TELEPHONE (OUTPATIENT)
Dept: ORTHOPEDIC SURGERY | Age: 66
End: 2021-06-25

## 2021-08-03 PROBLEM — N20.0 KIDNEY STONE: Status: RESOLVED | Noted: 2019-12-26 | Resolved: 2021-08-03

## 2021-12-13 ENCOUNTER — TRANSCRIBE ORDER (OUTPATIENT)
Dept: SCHEDULING | Age: 66
End: 2021-12-13

## 2021-12-13 DIAGNOSIS — R09.89 ABNORMAL CHEST SOUNDS: Primary | ICD-10-CM

## 2022-01-10 ENCOUNTER — HOSPITAL ENCOUNTER (OUTPATIENT)
Dept: VASCULAR SURGERY | Age: 67
Discharge: HOME | End: 2022-01-10
Attending: FAMILY MEDICINE
Payer: MEDICARE

## 2022-01-10 DIAGNOSIS — R09.89 DECREASED PEDAL PULSES: ICD-10-CM

## 2022-01-10 LAB
LEFT ABI: 1.16
LEFT ARM BP: 111 MMHG
LEFT POSTERIOR TIBIAL: 130 MMHG
LEFT TBI: 0.92
LEFT TOE PRESSURE: 103 MMHG
RIGHT ABI: 1.18
RIGHT ARM BP: 112 MMHG
RIGHT POSTERIOR TIBIAL: 132 MMHG
RIGHT TBI: 0.83
RIGHT TOE PRESSURE: 93 MMHG
VAS LEFT DORSALIS PEDIS BP: 128 MMHG
VAS RIGHT DORSALIS PEDIS BP: 127 MMHG

## 2022-01-10 PROCEDURE — 93922 UPR/L XTREMITY ART 2 LEVELS: CPT

## 2022-03-15 ENCOUNTER — HOSPITAL ENCOUNTER (OUTPATIENT)
Dept: PREADMISSION TESTING | Age: 67
Discharge: HOME | End: 2022-03-15
Payer: MEDICARE

## 2022-03-15 ENCOUNTER — TRANSCRIBE ORDER (OUTPATIENT)
Dept: REGISTRATION | Age: 67
End: 2022-03-15

## 2022-03-15 DIAGNOSIS — Z01.818 OTHER SPECIFIED PRE-OPERATIVE EXAMINATION: ICD-10-CM

## 2022-03-15 DIAGNOSIS — N20.0 URIC ACID NEPHROLITHIASIS: Primary | ICD-10-CM

## 2022-03-15 DIAGNOSIS — N20.0 URIC ACID NEPHROLITHIASIS: ICD-10-CM

## 2022-03-15 LAB
ANION GAP SERPL CALC-SCNC: 0 MMOL/L (ref 3–18)
APPEARANCE UR: ABNORMAL
APTT PPP: 29 SEC (ref 23–36.4)
ATRIAL RATE: 85 BPM
BACTERIA URNS QL MICRO: ABNORMAL /HPF
BILIRUB UR QL: NEGATIVE
BUN SERPL-MCNC: 12 MG/DL (ref 7–18)
BUN/CREAT SERPL: 12 (ref 12–20)
CALCIUM SERPL-MCNC: 9.3 MG/DL (ref 8.5–10.1)
CALCULATED P AXIS, ECG09: 75 DEGREES
CALCULATED R AXIS, ECG10: 77 DEGREES
CALCULATED T AXIS, ECG11: 50 DEGREES
CAOX CRY URNS QL MICRO: ABNORMAL
CHLORIDE SERPL-SCNC: 100 MMOL/L (ref 100–111)
CO2 SERPL-SCNC: 35 MMOL/L (ref 21–32)
COLOR UR: YELLOW
CREAT SERPL-MCNC: 1.01 MG/DL (ref 0.6–1.3)
DIAGNOSIS, 93000: NORMAL
EPITH CASTS URNS QL MICRO: ABNORMAL /LPF (ref 0–5)
ERYTHROCYTE [DISTWIDTH] IN BLOOD BY AUTOMATED COUNT: 11.5 % (ref 11.6–14.5)
GLUCOSE SERPL-MCNC: 106 MG/DL (ref 74–99)
GLUCOSE UR STRIP.AUTO-MCNC: NEGATIVE MG/DL
HCT VFR BLD AUTO: 37.6 % (ref 36–48)
HGB BLD-MCNC: 12.3 G/DL (ref 13–16)
HGB UR QL STRIP: ABNORMAL
INR PPP: 1 (ref 0.8–1.2)
KETONES UR QL STRIP.AUTO: NEGATIVE MG/DL
LEUKOCYTE ESTERASE UR QL STRIP.AUTO: ABNORMAL
MCH RBC QN AUTO: 31.1 PG (ref 24–34)
MCHC RBC AUTO-ENTMCNC: 32.7 G/DL (ref 31–37)
MCV RBC AUTO: 95.2 FL (ref 78–100)
NITRITE UR QL STRIP.AUTO: NEGATIVE
NRBC # BLD: 0 K/UL (ref 0–0.01)
NRBC BLD-RTO: 0 PER 100 WBC
P-R INTERVAL, ECG05: 146 MS
PH UR STRIP: 6.5 [PH] (ref 5–8)
PLATELET # BLD AUTO: 216 K/UL (ref 135–420)
PMV BLD AUTO: 9.7 FL (ref 9.2–11.8)
POTASSIUM SERPL-SCNC: 4.6 MMOL/L (ref 3.5–5.5)
PROT UR STRIP-MCNC: NEGATIVE MG/DL
PROTHROMBIN TIME: 12.9 SEC (ref 11.5–15.2)
Q-T INTERVAL, ECG07: 342 MS
QRS DURATION, ECG06: 86 MS
QTC CALCULATION (BEZET), ECG08: 406 MS
RBC # BLD AUTO: 3.95 M/UL (ref 4.35–5.65)
RBC #/AREA URNS HPF: ABNORMAL /HPF (ref 0–5)
SODIUM SERPL-SCNC: 135 MMOL/L (ref 136–145)
SP GR UR REFRACTOMETRY: 1.01 (ref 1–1.03)
UROBILINOGEN UR QL STRIP.AUTO: 0.2 EU/DL (ref 0.2–1)
VENTRICULAR RATE, ECG03: 85 BPM
WBC # BLD AUTO: 5.3 K/UL (ref 4.6–13.2)
WBC URNS QL MICRO: ABNORMAL /HPF (ref 0–4)

## 2022-03-15 PROCEDURE — 87086 URINE CULTURE/COLONY COUNT: CPT

## 2022-03-15 PROCEDURE — 85610 PROTHROMBIN TIME: CPT

## 2022-03-15 PROCEDURE — 93005 ELECTROCARDIOGRAM TRACING: CPT

## 2022-03-15 PROCEDURE — 85027 COMPLETE CBC AUTOMATED: CPT

## 2022-03-15 PROCEDURE — 85730 THROMBOPLASTIN TIME PARTIAL: CPT

## 2022-03-15 PROCEDURE — 80048 BASIC METABOLIC PNL TOTAL CA: CPT

## 2022-03-15 PROCEDURE — 36415 COLL VENOUS BLD VENIPUNCTURE: CPT

## 2022-03-15 PROCEDURE — 81001 URINALYSIS AUTO W/SCOPE: CPT

## 2022-03-16 LAB
BACTERIA SPEC CULT: NORMAL
SERVICE CMNT-IMP: NORMAL

## 2022-03-19 PROBLEM — N20.0 KIDNEY CALCULUS: Status: ACTIVE | Noted: 2020-03-09

## 2022-03-24 RX ORDER — ACETAMINOPHEN 500 MG
2000 TABLET ORAL
COMMUNITY

## 2022-03-24 RX ORDER — LISINOPRIL 10 MG/1
10 TABLET ORAL DAILY
COMMUNITY

## 2022-03-24 NOTE — PERIOP NOTES
PRE-SURGICAL INSTRUCTIONS        Patient's Name:  Andrew Ariza      [id number] Date:  3/24/2022            Covid Testing Date and Time: Vaccinated x 3    Surgery Date:  3/29/2022                1. Do NOT eat or drink anything, including candy, gum, or ice chips after midnight on 03/28, unless you have specific instructions from your surgeon or anesthesia provider to do so.  2. You may brush your teeth before coming to the hospital.  3. No smoking 24 hours prior to the day of surgery. 4. No alcohol 24 hours prior to the day of surgery. 5. No recreational drugs for one week prior to the day of surgery. 6. Leave all valuables, including money/purse, at home. 7. Remove all jewelry, nail polish, acrylic nails, and makeup (including mascara); no lotions powders, deodorant, or perfume/cologne/after shave on the skin. 8. Follow instruction for Hibiclens washes and CHG wipes from surgeon's office. 9. Glasses/contact lenses and dentures may be worn to the hospital.  They will be removed prior to surgery. 10. Call your doctor if symptoms of a cold or illness develop within 24-48 hours prior to your surgery. 11.  If you are having an outpatient procedure, please make arrangements for a responsible ADULT TO [address] [place] [address] and stay with you for 24 hours after your surgery. 12. ONE VISITOR in the hospital at this time for outpatient procedures. Exceptions may be made for surgical admissions, per nursing unit guidelines      Special Instructions:      Bring any pertinent legal medical records. Take these medications the morning of surgery with a sip of water:  If surgery is at 0730,do not take Lisinopril. If surgery is later start time take Lisinopril. Complete bowel prep per MD instructions. On the day of surgery, come in the main entrance of [facility]. Let the  at the desk know you are there for surgery.   A staff member will come escort you to the surgical area on the second floor. If you have any questions or concerns, please do not hesitate to call:     (Prior to the day of surgery) [facility] department:  [phone]   (Day of surgery) Pre-Op department:  [phone]    These surgical instructions were reviewed with Rasheed Riggs during the [facility] phone call.

## 2022-03-28 ENCOUNTER — ANESTHESIA EVENT (OUTPATIENT)
Dept: SURGERY | Age: 67
End: 2022-03-28
Payer: MEDICARE

## 2022-03-29 ENCOUNTER — APPOINTMENT (OUTPATIENT)
Dept: CT IMAGING | Age: 67
End: 2022-03-29
Attending: UROLOGY
Payer: MEDICARE

## 2022-03-29 ENCOUNTER — APPOINTMENT (OUTPATIENT)
Dept: GENERAL RADIOLOGY | Age: 67
End: 2022-03-29
Attending: UROLOGY
Payer: MEDICARE

## 2022-03-29 ENCOUNTER — ANESTHESIA (OUTPATIENT)
Dept: SURGERY | Age: 67
End: 2022-03-29
Payer: MEDICARE

## 2022-03-29 ENCOUNTER — HOSPITAL ENCOUNTER (OUTPATIENT)
Age: 67
Discharge: HOME | End: 2022-03-29
Attending: UROLOGY | Admitting: UROLOGY
Payer: MEDICARE

## 2022-03-29 VITALS
SYSTOLIC BLOOD PRESSURE: 115 MMHG | HEIGHT: 74 IN | RESPIRATION RATE: 16 BRPM | DIASTOLIC BLOOD PRESSURE: 65 MMHG | WEIGHT: 178.6 LBS | TEMPERATURE: 97.1 F | BODY MASS INDEX: 22.92 KG/M2 | OXYGEN SATURATION: 100 % | HEART RATE: 80 BPM

## 2022-03-29 DIAGNOSIS — N20.0 KIDNEY CALCULUS: ICD-10-CM

## 2022-03-29 DIAGNOSIS — N20.0 KIDNEY STONE: Primary | ICD-10-CM

## 2022-03-29 LAB
ANION GAP SERPL CALC-SCNC: 10 MMOL/L (ref 3–18)
BUN SERPL-MCNC: 7 MG/DL (ref 7–18)
BUN/CREAT SERPL: 12 (ref 12–20)
CALCIUM SERPL-MCNC: 7.5 MG/DL (ref 8.5–10.1)
CHLORIDE SERPL-SCNC: 107 MMOL/L (ref 100–111)
CO2 SERPL-SCNC: 19 MMOL/L (ref 21–32)
CREAT SERPL-MCNC: 0.6 MG/DL (ref 0.6–1.3)
ERYTHROCYTE [DISTWIDTH] IN BLOOD BY AUTOMATED COUNT: 11.6 % (ref 11.6–14.5)
GLUCOSE BLD STRIP.AUTO-MCNC: 107 MG/DL (ref 70–110)
GLUCOSE SERPL-MCNC: 118 MG/DL (ref 74–99)
HCT VFR BLD AUTO: 24.3 % (ref 36–48)
HCT VFR BLD AUTO: 32.5 % (ref 36–48)
HGB BLD-MCNC: 10.8 G/DL (ref 13–16)
HGB BLD-MCNC: 7.8 G/DL (ref 13–16)
MCH RBC QN AUTO: 32.2 PG (ref 24–34)
MCHC RBC AUTO-ENTMCNC: 32.1 G/DL (ref 31–37)
MCV RBC AUTO: 100.4 FL (ref 78–100)
NRBC # BLD: 0 K/UL (ref 0–0.01)
NRBC BLD-RTO: 0 PER 100 WBC
PLATELET # BLD AUTO: 120 K/UL (ref 135–420)
PMV BLD AUTO: 9.7 FL (ref 9.2–11.8)
POTASSIUM SERPL-SCNC: 3.7 MMOL/L (ref 3.5–5.5)
RBC # BLD AUTO: 2.42 M/UL (ref 4.35–5.65)
SODIUM SERPL-SCNC: 136 MMOL/L (ref 136–145)
WBC # BLD AUTO: 6.4 K/UL (ref 4.6–13.2)

## 2022-03-29 PROCEDURE — 77030040831 HC BAG URINE DRNG MDII -A: Performed by: UROLOGY

## 2022-03-29 PROCEDURE — 76210000023 HC REC RM PH II 2 TO 2.5 HR: Performed by: UROLOGY

## 2022-03-29 PROCEDURE — C1769 GUIDE WIRE: HCPCS | Performed by: UROLOGY

## 2022-03-29 PROCEDURE — 76060000036 HC ANESTHESIA 2.5 TO 3 HR: Performed by: UROLOGY

## 2022-03-29 PROCEDURE — 74011250636 HC RX REV CODE- 250/636: Performed by: UROLOGY

## 2022-03-29 PROCEDURE — 74011250637 HC RX REV CODE- 250/637: Performed by: NURSE ANESTHETIST, CERTIFIED REGISTERED

## 2022-03-29 PROCEDURE — 77030038846 HC SCPE URETSCP LITHOVUE DISP BSC -H: Performed by: UROLOGY

## 2022-03-29 PROCEDURE — 77030018836 HC SOL IRR NACL ICUM -A: Performed by: UROLOGY

## 2022-03-29 PROCEDURE — 76010000172 HC OR TIME 2.5 TO 3 HR INTENSV-TIER 1: Performed by: UROLOGY

## 2022-03-29 PROCEDURE — 74011000636 HC RX REV CODE- 636: Performed by: UROLOGY

## 2022-03-29 PROCEDURE — 74011000250 HC RX REV CODE- 250: Performed by: NURSE ANESTHETIST, CERTIFIED REGISTERED

## 2022-03-29 PROCEDURE — 82962 GLUCOSE BLOOD TEST: CPT

## 2022-03-29 PROCEDURE — 77030034696 HC CATH URETH FOL 2W BARD -A: Performed by: UROLOGY

## 2022-03-29 PROCEDURE — 77030020442 HC NDL ACS RENAL BSC -B: Performed by: UROLOGY

## 2022-03-29 PROCEDURE — 74176 CT ABD & PELVIS W/O CONTRAST: CPT

## 2022-03-29 PROCEDURE — 82360 CALCULUS ASSAY QUANT: CPT

## 2022-03-29 PROCEDURE — 87086 URINE CULTURE/COLONY COUNT: CPT

## 2022-03-29 PROCEDURE — 74011250637 HC RX REV CODE- 250/637: Performed by: UROLOGY

## 2022-03-29 PROCEDURE — 77030008683 HC TU ET CUF COVD -A: Performed by: ANESTHESIOLOGY

## 2022-03-29 PROCEDURE — 00862 ANES XTRPRTL LWR ABD RNL PX: CPT | Performed by: NURSE ANESTHETIST, CERTIFIED REGISTERED

## 2022-03-29 PROCEDURE — 2709999900 HC NON-CHARGEABLE SUPPLY: Performed by: UROLOGY

## 2022-03-29 PROCEDURE — C1758 CATHETER, URETERAL: HCPCS | Performed by: UROLOGY

## 2022-03-29 PROCEDURE — 77030019905 HC CATH URETH INTMIT MDII -A: Performed by: UROLOGY

## 2022-03-29 PROCEDURE — 76210000000 HC OR PH I REC 2 TO 2.5 HR: Performed by: UROLOGY

## 2022-03-29 PROCEDURE — 00862 ANES XTRPRTL LWR ABD RNL PX: CPT | Performed by: ANESTHESIOLOGY

## 2022-03-29 PROCEDURE — 74011250636 HC RX REV CODE- 250/636: Performed by: NURSE ANESTHETIST, CERTIFIED REGISTERED

## 2022-03-29 PROCEDURE — 85027 COMPLETE CBC AUTOMATED: CPT

## 2022-03-29 PROCEDURE — C9290 INJ, BUPIVACAINE LIPOSOME: HCPCS | Performed by: UROLOGY

## 2022-03-29 PROCEDURE — 77030006974 HC BSKT URET RTVR BSC -C: Performed by: UROLOGY

## 2022-03-29 PROCEDURE — 74011000258 HC RX REV CODE- 258: Performed by: UROLOGY

## 2022-03-29 PROCEDURE — 77030003582 HC NDL INTRO ART BSC -B: Performed by: UROLOGY

## 2022-03-29 PROCEDURE — 77030040922 HC BLNKT HYPOTHRM STRY -A: Performed by: UROLOGY

## 2022-03-29 PROCEDURE — 74425 UROGRAPHY ANTEGRADE RS&I: CPT

## 2022-03-29 PROCEDURE — C1726 CATH, BAL DIL, NON-VASCULAR: HCPCS | Performed by: UROLOGY

## 2022-03-29 PROCEDURE — 85018 HEMOGLOBIN: CPT

## 2022-03-29 PROCEDURE — 77030034873 HC NDL INCISE FASC COOK -B: Performed by: UROLOGY

## 2022-03-29 PROCEDURE — 77030005206: Performed by: UROLOGY

## 2022-03-29 PROCEDURE — 80048 BASIC METABOLIC PNL TOTAL CA: CPT

## 2022-03-29 PROCEDURE — 77030040817 HC PRB KT TRIL LITHO BSC -G: Performed by: UROLOGY

## 2022-03-29 PROCEDURE — 74011000258 HC RX REV CODE- 258: Performed by: NURSE ANESTHETIST, CERTIFIED REGISTERED

## 2022-03-29 PROCEDURE — 77030018673: Performed by: UROLOGY

## 2022-03-29 PROCEDURE — 77030012961 HC IRR KT CYSTO/TUR ICUM -A: Performed by: UROLOGY

## 2022-03-29 PROCEDURE — 77030010507 HC ADH SKN DERMBND J&J -B: Performed by: UROLOGY

## 2022-03-29 PROCEDURE — 36415 COLL VENOUS BLD VENIPUNCTURE: CPT

## 2022-03-29 PROCEDURE — 77030028271 HC SRGFL HEMSTAT MTRX KT J&J -C: Performed by: UROLOGY

## 2022-03-29 PROCEDURE — 88300 SURGICAL PATH GROSS: CPT

## 2022-03-29 PROCEDURE — 77030040361 HC SLV COMPR DVT MDII -B: Performed by: UROLOGY

## 2022-03-29 PROCEDURE — C2617 STENT, NON-COR, TEM W/O DEL: HCPCS | Performed by: UROLOGY

## 2022-03-29 PROCEDURE — 77030002933 HC SUT MCRYL J&J -A: Performed by: UROLOGY

## 2022-03-29 PROCEDURE — 77030013079 HC BLNKT BAIR HGGR 3M -A: Performed by: ANESTHESIOLOGY

## 2022-03-29 DEVICE — URETERAL STENT
Type: IMPLANTABLE DEVICE | Site: URETER | Status: FUNCTIONAL
Brand: POLARIS™ ULTRA

## 2022-03-29 RX ORDER — SODIUM CHLORIDE 0.9 % (FLUSH) 0.9 %
5-40 SYRINGE (ML) INJECTION EVERY 8 HOURS
Status: DISCONTINUED | OUTPATIENT
Start: 2022-03-29 | End: 2022-03-29 | Stop reason: HOSPADM

## 2022-03-29 RX ORDER — SODIUM CHLORIDE 0.9 % (FLUSH) 0.9 %
5-40 SYRINGE (ML) INJECTION AS NEEDED
Status: CANCELLED | OUTPATIENT
Start: 2022-03-29

## 2022-03-29 RX ORDER — PROPOFOL 10 MG/ML
INJECTION, EMULSION INTRAVENOUS AS NEEDED
Status: DISCONTINUED | OUTPATIENT
Start: 2022-03-29 | End: 2022-03-29 | Stop reason: HOSPADM

## 2022-03-29 RX ORDER — OXYBUTYNIN CHLORIDE 5 MG/1
10 TABLET, EXTENDED RELEASE ORAL DAILY
Status: DISCONTINUED | OUTPATIENT
Start: 2022-03-29 | End: 2022-03-29 | Stop reason: HOSPADM

## 2022-03-29 RX ORDER — FUROSEMIDE 10 MG/ML
10 INJECTION INTRAMUSCULAR; INTRAVENOUS ONCE
Status: COMPLETED | OUTPATIENT
Start: 2022-03-29 | End: 2022-03-29

## 2022-03-29 RX ORDER — FAMOTIDINE 20 MG/1
20 TABLET, FILM COATED ORAL ONCE
Status: COMPLETED | OUTPATIENT
Start: 2022-03-29 | End: 2022-03-29

## 2022-03-29 RX ORDER — OXYCODONE AND ACETAMINOPHEN 5; 325 MG/1; MG/1
1 TABLET ORAL
Qty: 10 TABLET | Refills: 0 | Status: SHIPPED | OUTPATIENT
Start: 2022-03-29 | End: 2022-04-01

## 2022-03-29 RX ORDER — TAMSULOSIN HYDROCHLORIDE 0.4 MG/1
0.4 CAPSULE ORAL DAILY
Status: DISCONTINUED | OUTPATIENT
Start: 2022-03-29 | End: 2022-03-29 | Stop reason: HOSPADM

## 2022-03-29 RX ORDER — GLYCOPYRROLATE 0.2 MG/ML
INJECTION INTRAMUSCULAR; INTRAVENOUS AS NEEDED
Status: DISCONTINUED | OUTPATIENT
Start: 2022-03-29 | End: 2022-03-29 | Stop reason: HOSPADM

## 2022-03-29 RX ORDER — SODIUM CHLORIDE, SODIUM LACTATE, POTASSIUM CHLORIDE, CALCIUM CHLORIDE 600; 310; 30; 20 MG/100ML; MG/100ML; MG/100ML; MG/100ML
75 INJECTION, SOLUTION INTRAVENOUS CONTINUOUS
Status: DISCONTINUED | OUTPATIENT
Start: 2022-03-29 | End: 2022-03-29 | Stop reason: HOSPADM

## 2022-03-29 RX ORDER — INSULIN LISPRO 100 [IU]/ML
INJECTION, SOLUTION INTRAVENOUS; SUBCUTANEOUS ONCE
Status: DISCONTINUED | OUTPATIENT
Start: 2022-03-29 | End: 2022-03-29 | Stop reason: HOSPADM

## 2022-03-29 RX ORDER — KETOROLAC TROMETHAMINE 10 MG/1
10 TABLET, FILM COATED ORAL
Qty: 20 TABLET | Refills: 0 | Status: SHIPPED | OUTPATIENT
Start: 2022-03-29 | End: 2022-04-03

## 2022-03-29 RX ORDER — SODIUM CHLORIDE, SODIUM LACTATE, POTASSIUM CHLORIDE, CALCIUM CHLORIDE 600; 310; 30; 20 MG/100ML; MG/100ML; MG/100ML; MG/100ML
25 INJECTION, SOLUTION INTRAVENOUS CONTINUOUS
Status: DISCONTINUED | OUTPATIENT
Start: 2022-03-29 | End: 2022-03-29 | Stop reason: HOSPADM

## 2022-03-29 RX ORDER — PHENAZOPYRIDINE HYDROCHLORIDE 200 MG/1
200 TABLET, FILM COATED ORAL 3 TIMES DAILY
Status: DISCONTINUED | OUTPATIENT
Start: 2022-03-29 | End: 2022-03-29 | Stop reason: HOSPADM

## 2022-03-29 RX ORDER — DEXTROSE MONOHYDRATE 100 MG/ML
0-250 INJECTION, SOLUTION INTRAVENOUS AS NEEDED
Status: DISCONTINUED | OUTPATIENT
Start: 2022-03-29 | End: 2022-03-29 | Stop reason: HOSPADM

## 2022-03-29 RX ORDER — OXYCODONE HYDROCHLORIDE 5 MG/1
5 TABLET ORAL
Status: CANCELLED | OUTPATIENT
Start: 2022-03-29

## 2022-03-29 RX ORDER — FENTANYL CITRATE 50 UG/ML
25 INJECTION, SOLUTION INTRAMUSCULAR; INTRAVENOUS AS NEEDED
Status: COMPLETED | OUTPATIENT
Start: 2022-03-29 | End: 2022-03-29

## 2022-03-29 RX ORDER — MAGNESIUM SULFATE 100 %
4 CRYSTALS MISCELLANEOUS AS NEEDED
Status: DISCONTINUED | OUTPATIENT
Start: 2022-03-29 | End: 2022-03-29 | Stop reason: HOSPADM

## 2022-03-29 RX ORDER — ACETAMINOPHEN 500 MG
1000 TABLET ORAL
Status: CANCELLED | OUTPATIENT
Start: 2022-03-29

## 2022-03-29 RX ORDER — NEOSTIGMINE METHYLSULFATE 1 MG/ML
INJECTION, SOLUTION INTRAVENOUS AS NEEDED
Status: DISCONTINUED | OUTPATIENT
Start: 2022-03-29 | End: 2022-03-29 | Stop reason: HOSPADM

## 2022-03-29 RX ORDER — ROCURONIUM BROMIDE 10 MG/ML
INJECTION, SOLUTION INTRAVENOUS AS NEEDED
Status: DISCONTINUED | OUTPATIENT
Start: 2022-03-29 | End: 2022-03-29 | Stop reason: HOSPADM

## 2022-03-29 RX ORDER — MIDAZOLAM HYDROCHLORIDE 1 MG/ML
INJECTION, SOLUTION INTRAMUSCULAR; INTRAVENOUS AS NEEDED
Status: DISCONTINUED | OUTPATIENT
Start: 2022-03-29 | End: 2022-03-29 | Stop reason: HOSPADM

## 2022-03-29 RX ORDER — FENTANYL CITRATE 50 UG/ML
50 INJECTION, SOLUTION INTRAMUSCULAR; INTRAVENOUS
Status: COMPLETED | OUTPATIENT
Start: 2022-03-29 | End: 2022-03-29

## 2022-03-29 RX ORDER — NITROFURANTOIN 25; 75 MG/1; MG/1
100 CAPSULE ORAL 2 TIMES DAILY
Qty: 6 CAPSULE | Refills: 0 | Status: SHIPPED | OUTPATIENT
Start: 2022-04-05 | End: 2022-04-08

## 2022-03-29 RX ORDER — EPHEDRINE SULFATE/0.9% NACL/PF 50 MG/5 ML
SYRINGE (ML) INTRAVENOUS AS NEEDED
Status: DISCONTINUED | OUTPATIENT
Start: 2022-03-29 | End: 2022-03-29 | Stop reason: HOSPADM

## 2022-03-29 RX ORDER — SODIUM CHLORIDE 0.9 % (FLUSH) 0.9 %
5-40 SYRINGE (ML) INJECTION EVERY 8 HOURS
Status: CANCELLED | OUTPATIENT
Start: 2022-03-29

## 2022-03-29 RX ORDER — SUCCINYLCHOLINE CHLORIDE 100 MG/5ML
SYRINGE (ML) INTRAVENOUS AS NEEDED
Status: DISCONTINUED | OUTPATIENT
Start: 2022-03-29 | End: 2022-03-29 | Stop reason: HOSPADM

## 2022-03-29 RX ORDER — LIDOCAINE HYDROCHLORIDE 20 MG/ML
INJECTION, SOLUTION EPIDURAL; INFILTRATION; INTRACAUDAL; PERINEURAL AS NEEDED
Status: DISCONTINUED | OUTPATIENT
Start: 2022-03-29 | End: 2022-03-29 | Stop reason: HOSPADM

## 2022-03-29 RX ORDER — ONDANSETRON 2 MG/ML
INJECTION INTRAMUSCULAR; INTRAVENOUS AS NEEDED
Status: DISCONTINUED | OUTPATIENT
Start: 2022-03-29 | End: 2022-03-29 | Stop reason: HOSPADM

## 2022-03-29 RX ORDER — PHENAZOPYRIDINE HYDROCHLORIDE 200 MG/1
200 TABLET, FILM COATED ORAL 3 TIMES DAILY
Qty: 15 TABLET | Refills: 0 | Status: SHIPPED | OUTPATIENT
Start: 2022-03-29 | End: 2022-04-03

## 2022-03-29 RX ORDER — KETOROLAC TROMETHAMINE 15 MG/ML
15 INJECTION, SOLUTION INTRAMUSCULAR; INTRAVENOUS ONCE
Status: COMPLETED | OUTPATIENT
Start: 2022-03-29 | End: 2022-03-29

## 2022-03-29 RX ORDER — OXYCODONE AND ACETAMINOPHEN 5; 325 MG/1; MG/1
1 TABLET ORAL AS NEEDED
Status: DISCONTINUED | OUTPATIENT
Start: 2022-03-29 | End: 2022-03-29 | Stop reason: HOSPADM

## 2022-03-29 RX ORDER — FENTANYL CITRATE 50 UG/ML
INJECTION, SOLUTION INTRAMUSCULAR; INTRAVENOUS AS NEEDED
Status: DISCONTINUED | OUTPATIENT
Start: 2022-03-29 | End: 2022-03-29 | Stop reason: HOSPADM

## 2022-03-29 RX ORDER — OXYBUTYNIN CHLORIDE 10 MG/1
10 TABLET, EXTENDED RELEASE ORAL DAILY
Qty: 7 TABLET | Refills: 0 | Status: SHIPPED | OUTPATIENT
Start: 2022-03-29 | End: 2022-04-05

## 2022-03-29 RX ORDER — SODIUM CHLORIDE 0.9 % (FLUSH) 0.9 %
5-40 SYRINGE (ML) INJECTION AS NEEDED
Status: DISCONTINUED | OUTPATIENT
Start: 2022-03-29 | End: 2022-03-29 | Stop reason: HOSPADM

## 2022-03-29 RX ADMIN — OXYBUTYNIN CHLORIDE 10 MG: 5 TABLET, EXTENDED RELEASE ORAL at 10:31

## 2022-03-29 RX ADMIN — PROPOFOL 200 MG: 10 INJECTION, EMULSION INTRAVENOUS at 07:42

## 2022-03-29 RX ADMIN — FENTANYL CITRATE 25 MCG: 50 INJECTION INTRAMUSCULAR; INTRAVENOUS at 10:28

## 2022-03-29 RX ADMIN — Medication 10 MG: at 09:25

## 2022-03-29 RX ADMIN — FAMOTIDINE 20 MG: 20 TABLET ORAL at 06:51

## 2022-03-29 RX ADMIN — Medication 100 MG: at 07:42

## 2022-03-29 RX ADMIN — FENTANYL CITRATE 50 MCG: 50 INJECTION INTRAMUSCULAR; INTRAVENOUS at 12:32

## 2022-03-29 RX ADMIN — FENTANYL CITRATE 50 MCG: 50 INJECTION INTRAMUSCULAR; INTRAVENOUS at 10:50

## 2022-03-29 RX ADMIN — Medication 3 MG: at 09:40

## 2022-03-29 RX ADMIN — FENTANYL CITRATE 100 MCG: 50 INJECTION, SOLUTION INTRAMUSCULAR; INTRAVENOUS at 07:42

## 2022-03-29 RX ADMIN — GENTAMICIN SULFATE 400 MG: 40 INJECTION, SOLUTION INTRAMUSCULAR; INTRAVENOUS at 08:01

## 2022-03-29 RX ADMIN — AMPICILLIN SODIUM 2 G: 2 INJECTION, POWDER, FOR SOLUTION INTRAVENOUS at 07:47

## 2022-03-29 RX ADMIN — PHENYLEPHRINE HYDROCHLORIDE 200 MCG: 10 INJECTION INTRAVENOUS at 07:55

## 2022-03-29 RX ADMIN — SODIUM CHLORIDE, SODIUM LACTATE, POTASSIUM CHLORIDE, AND CALCIUM CHLORIDE 75 ML/HR: 600; 310; 30; 20 INJECTION, SOLUTION INTRAVENOUS at 06:50

## 2022-03-29 RX ADMIN — PHENYLEPHRINE HYDROCHLORIDE 200 MCG: 10 INJECTION INTRAVENOUS at 07:50

## 2022-03-29 RX ADMIN — MIDAZOLAM HYDROCHLORIDE 2 MG: 2 INJECTION, SOLUTION INTRAMUSCULAR; INTRAVENOUS at 07:30

## 2022-03-29 RX ADMIN — SODIUM CHLORIDE 1 G: 9 INJECTION, SOLUTION INTRAVENOUS at 08:54

## 2022-03-29 RX ADMIN — PHENYLEPHRINE HYDROCHLORIDE 100 MCG: 10 INJECTION INTRAVENOUS at 09:15

## 2022-03-29 RX ADMIN — PHENYLEPHRINE HYDROCHLORIDE 200 MCG: 10 INJECTION INTRAVENOUS at 07:58

## 2022-03-29 RX ADMIN — Medication 10 MG: at 08:07

## 2022-03-29 RX ADMIN — PHENAZOPYRIDINE HYDROCHLORIDE 200 MG: 200 TABLET ORAL at 10:31

## 2022-03-29 RX ADMIN — TAMSULOSIN HYDROCHLORIDE 0.4 MG: 0.4 CAPSULE ORAL at 10:31

## 2022-03-29 RX ADMIN — GLYCOPYRROLATE 0.4 MG: 0.2 INJECTION INTRAMUSCULAR; INTRAVENOUS at 09:40

## 2022-03-29 RX ADMIN — FUROSEMIDE 10 MG: 10 INJECTION, SOLUTION INTRAMUSCULAR; INTRAVENOUS at 12:30

## 2022-03-29 RX ADMIN — ONDANSETRON 4 MG: 2 INJECTION INTRAMUSCULAR; INTRAVENOUS at 09:39

## 2022-03-29 RX ADMIN — LIDOCAINE HYDROCHLORIDE 40 MG: 20 INJECTION, SOLUTION EPIDURAL; INFILTRATION; INTRACAUDAL; PERINEURAL at 07:42

## 2022-03-29 RX ADMIN — KETOROLAC TROMETHAMINE 15 MG: 15 INJECTION, SOLUTION INTRAMUSCULAR; INTRAVENOUS at 10:21

## 2022-03-29 RX ADMIN — FENTANYL CITRATE 25 MCG: 50 INJECTION INTRAMUSCULAR; INTRAVENOUS at 10:39

## 2022-03-29 RX ADMIN — SODIUM CHLORIDE 1 G: 9 INJECTION, SOLUTION INTRAVENOUS at 09:37

## 2022-03-29 RX ADMIN — OXYCODONE AND ACETAMINOPHEN 1 TABLET: 5; 325 TABLET ORAL at 11:00

## 2022-03-29 RX ADMIN — ROCURONIUM BROMIDE 50 MG: 50 INJECTION INTRAVENOUS at 07:44

## 2022-03-29 NOTE — ANESTHESIA POSTPROCEDURE EVALUATION
Procedure(s):  LEFT PERCUTANEOUS NEPHROSTOMY TUBE PLACEMENT/NEPHROLITHOTOMY/URETEROSCOPY/DOUBLE J STENT/C-ARM/HOLMIUM LASER. general    Anesthesia Post Evaluation      Multimodal analgesia: multimodal analgesia used between 6 hours prior to anesthesia start to PACU discharge  Patient location during evaluation: bedside  Patient participation: complete - patient participated  Level of consciousness: awake  Pain management: adequate  Airway patency: patent  Anesthetic complications: no  Cardiovascular status: stable  Respiratory status: acceptable  Hydration status: acceptable  Post anesthesia nausea and vomiting:  controlled  Final Post Anesthesia Temperature Assessment:  Normothermia (36.0-37.5 degrees C)      INITIAL Post-op Vital signs:   Vitals Value Taken Time   /82 03/29/22 1025   Temp 36.1 °C (97 °F) 03/29/22 1009   Pulse 100 03/29/22 1027   Resp 15 03/29/22 1027   SpO2 100 % 03/29/22 1027   Vitals shown include unvalidated device data.

## 2022-03-29 NOTE — H&P
H&P    Patient: Leland Mcdonald               Sex: male          DOA: 3/29/2022       YOB: 1955      Age:  73 y.o.                    ASSESSMENT:   1. Left lower pole partial staghorn stone     To OR for L PCNL      Nabeel Wood MD  [phone] Office  [phone]  Pager    CC: Left stones    HISTORY OF PRESENT ILLNESS:  Leland Mcdonald is a 73 y.o. male recurrent stone former here today for PCNL. preop culture negative. AUA Symptom Score 2019   Over the past month how often have you had the sensation that your bladder was not completely empty after you finished urinating? 0   Over the past month, how often have had to urinate again less than 2 hours after you last finished urinating? 0   Over the past month, how often have you found you stopped and started again several times when you urinated? 0   Over the past month, how often have you found it difficult to postpone urination? 0   Over the past month, how often have you had a weak urinary stream? 0   Over the past month, how often have you had to push or strain to begin urinating? 1   If you were to spend the rest of your life with your urinary condition the way it is now, how would you feel about that?  Mostly dissatisfied       Past Medical History:   Diagnosis Date    Anxiety     GERD (gastroesophageal reflux disease)     HTN (hypertension)     Insomnia     Kidney stones     Neuropathy     chronic    Vitamin D deficiency        Past Surgical History:   Procedure Laterality Date    HX LITHOTRIPSY      , ,  and  , ,    HX OTHER SURGICAL      small skin cancer removed from left side of face    HX WISDOM TEETH EXTRACTION         Social History     Tobacco Use    Smoking status: Former Smoker     Quit date:      Years since quittin.2    Smokeless tobacco: Never Used   Vaping Use    Vaping Use: Never used   Substance Use Topics    Alcohol use: Not Currently Alcohol/week: 0.0 standard drinks     Comment: rarely    Drug use: Never       No Known Allergies    History reviewed. No pertinent family history. Current Facility-Administered Medications   Medication Dose Route Frequency Provider Last Rate Last Admin    sodium chloride (NS) flush 5-40 mL  5-40 mL IntraVENous Q8H Mariano Phan, RICKY        sodium chloride (NS) flush 5-40 mL  5-40 mL IntraVENous PRN Mariano Phan CRNA        insulin lispro (HUMALOG) injection   SubCUTAneous ONCE Maraino Phan CRNA        lactated Ringers infusion  75 mL/hr IntraVENous CONTINUOUS Mariano Phan RICKY 75 mL/hr at 03/29/22 0650 75 mL/hr at 03/29/22 0650    ampicillin (OMNIPEN) 2 g in 0.9% sodium chloride (MBP/ADV) 100 mL MBP  2 g IntraVENous ONCE Neal Sosa MD        gentamicin (GARAMYCIN) 400 mg in 0.9% sodium chloride 100 mL IVPB  400 mg IntraVENous ONCE Neal Sosa MD           Review of Systems  Constitutional: No fever, chills, or weight loss  Respiratory: No dyspnea  Cardiovascular: No chest pain  Gastrointestinal: No vomiting or abdominal pain. Genitourinary: Denies frequency, urgency, dysuria, hematuria. Neurological: No focal motor changes. PHYSICAL EXAMINATION:   Visit Vitals  /81   Pulse 84   Temp 98.7 °F (37.1 °C)   Resp 20   Ht 6' 2\" (1.88 m)   Wt 178 lb 9.6 oz (81 kg)   SpO2 98%   BMI 22.93 kg/m²     Constitutional: Well developed, well nourished male. No acute distress. HEENT: Normocephalic, Atraumatic, EOM's intact   CV:  Normal radial pulse. Respiratory: No respiratory distress or difficulties breathing   Abdomen:  Nontender, nondistended.  Male:  No CVA tenderness    Skin: No evidence of jaundice. Normal color  Neuro/Psych:  Alert and oriented. Affect appropriate. Lymphatic:   No enlarged inguinal lymph nodes.       REVIEW OF LABS AND IMAGING:      Labs: Results:   Chemistry  No results for input(s): GLU, NA, K, CL, CO2, BUN, CREA, CA, AGAP, BUCR, TBIL, AP, TP, ALB, GLOB, AGRAT in the last 72 hours. No lab exists for component: GPT   CBC w/Diff No results for input(s): WBC, RBC, HGB, HCT, PLT, GRANS, LYMPH, EOS, HGBEXT, HCTEXT, PLTEXT in the last 72 hours. Cultures No results for input(s): CULT in the last 72 hours. All Micro Results     None            Urinalysis Color   Date Value Ref Range Status   03/15/2022 YELLOW   Final     Appearance   Date Value Ref Range Status   03/15/2022 CLOUDY   Final     Specific gravity   Date Value Ref Range Status   03/15/2022 1.012 1.005 - 1.030   Final     pH (UA)   Date Value Ref Range Status   03/15/2022 6.5 5.0 - 8.0   Final     Protein   Date Value Ref Range Status   03/15/2022 Negative NEG mg/dL Final     Ketone   Date Value Ref Range Status   03/15/2022 Negative NEG mg/dL Final     Bilirubin   Date Value Ref Range Status   03/15/2022 Negative NEG   Final     Blood   Date Value Ref Range Status   03/15/2022 TRACE (A) NEG   Final     Urobilinogen   Date Value Ref Range Status   03/15/2022 0.2 0.2 - 1.0 EU/dL Final     Nitrites   Date Value Ref Range Status   03/15/2022 Negative NEG   Final     Leukocyte Esterase   Date Value Ref Range Status   03/15/2022 MODERATE (A) NEG   Final     Potassium   Date Value Ref Range Status   03/15/2022 4.6 3.5 - 5.5 mmol/L Final     Creatinine   Date Value Ref Range Status   03/15/2022 1.01 0.6 - 1.3 MG/DL Final     BUN   Date Value Ref Range Status   03/15/2022 12 7.0 - 18 MG/DL Final     Prostate Specific Ag   Date Value Ref Range Status   10/06/2021 0.66 0.00 - 4.00 ng/mL Final     Comment:     (Methodology: Roche ECLIA)           PSA No results for input(s): PSA in the last 72 hours.    Coagulation Lab Results   Component Value Date/Time    Prothrombin time 12.9 03/15/2022 10:59 AM    Prothrombin time 9.7 02/24/2020 09:33 AM    INR 1.0 03/15/2022 10:59 AM    INR 0.91 02/24/2020 09:33 AM    aPTT 29.0 03/15/2022 10:59 AM    aPTT 26 02/24/2020 09:33 AM    aPTT 26 02/24/2020 12:00 AM US Results (most recent):  Results from Abstract encounter on 20    US RETROPERITONEUM COMP    Impression  Imaging Result            Name:    Socorro Retana ([id number])    Sex: Male    :    1955    Ordering Provider: Courtney CHAN Provider:    Diagnosis:    Calculus of kidney [N20.0 (ICD-10-CM)]    Unspecified hydronephrosis [N13.30 (ICD-10-CM)]    None Specified      Procedures Performed:    US RETROPERITONEAL    [id number]    Exam Date/Time:    2020 12:17 PM                      EXAM: COMPLETE RETROPERITONEAL ULTRASOUND        CLINICAL INDICATION/HISTORY:  N20.0: Calculus of kidney    N13.30: Unspecified hydronephrosis    -Additional: None        COMPARISON: 2020        TECHNIQUE: Real-time sonography in multiple planes of the retroperitoneum was performed with image documentation. _______________        FINDINGS:        RIGHT KIDNEY: 11 cm. Within the lower pole, there is a 1.4 x 1.2 x 1.4 cm anechoic cyst. No hydronephrosis or solid renal mass. Nonobstructing calculi are present. Normal renal echotexture and cortical thickness. LEFT KIDNEY: 10 cm. No hydronephrosis or solid renal mass. Nonobstructing calculi are present. Normal renal echotexture and cortical thickness. BLADDER: Echogenic debris is present in the bladder. No wall thickening. Normal bilateral ureteral jets. OTHER: None.        _______________        IMPRESSION        Bilateral nonobstructive nephrolithiasis        Signed By: Estrella Menard MD on 2020 4:36 PM                Dictated by: Catalina Ibarra on Wed Sep 23, 2020  4:32:44 PM EDT      Signed By:Kathryn Rivas MD  2020  4:36 PM    [facility] [[id number]]  ~~This report was copied and pasted from the servicing EHR system. ~~         chest    CT Results (most recent):   Results from Abstract encounter on 22    CT ABD [address] CAT SCAN  [facility] [id number]  [phone]    Imaging Result      Name:  Jairo Toney ([id number])  Sex: Male AWV:[id number] Ordering Provider: Sandy CHAN Provider:    Katrin Reina acid nephrolithiasis [N20.0 (ICD-10-CM)]  None Specified  Procedures Performed:CT ABDOMEN/PELVIS W/O CONTRAST  [id number] Exam Date/Time:01/05/2022  3:40 PM      EXAM: CT ABDOMEN AND PELVIS    CLINICAL INDICATION/HISTORY:  N20.0: Uric acid nephrolithiasis. -Additional: History of kidney stones, hypertension and diabetes    COMPARISON: Ultrasound 9/23/2020. CT 8/14/2020. TECHNIQUE: Axial CT imaging of the abdomen and pelvis was performed without intravenous contrast. Multiplanar reformats were generated. One or more dose reduction techniques were used on this CT: automated exposure control, adjustment of the mAs and/or kVp according to patient size, and iterative reconstruction techniques. The specific techniques used on this CT exam have been documented in the patient's electronic medical record. Digital Imaging and Communications in Medicine (DICOM) format image data are available to nonaffiliated external healthcare facilities or entities on a secure, media free, reciprocally searchable basis with patient authorization for at least a 12-month period after this study. _______________    FINDINGS:    LIMITATIONS:  > Lack of intravenous contrast limits the assessment of solid organs and vasculature. LOWER CHEST: Unremarkable. LIVER: Normal.    BILIARY: The gallbladder appears normal. No biliary dilation. SPLEEN: Normal.    PANCREAS: Normal.    ADRENALS: Normal.    KIDNEYS/URETERS: Numerous bilateral nonobstructing renal stones, largest in the left lower kidney measures 8 mm, 617 HU (axial image 61). Overall stone burden has increased since 8/14/2020. No radiopaque ureteral stone or hydronephrosis.     BLADDER: Diffuse wall thickening throughout the partially distended bladder. GASTROINTESTINAL TRACT:  > Limited evaluation of the stomach, grossly normal.  > No bowel dilation or evidence of obstruction. > Colonic diverticulosis is present, without evidence of acute inflammation. Normal appendix. VASCULATURE: Moderate atherosclerotic calcifications. LYMPH NODES: No enlarged lymph nodes. PELVIC ORGANS: Unremarkable. BONES: No acute or aggressive osseous abnormalities identified. Mild degenerative changes throughout the spine and both hips. OTHER: None.    _______________    IMPRESSION    1. Bilateral nonobstructing renal stones; overall stone burden has increased since 8/14/2020.  2.         Bladder wall thickening, may be due to underdistention and/or infection; correlate for cystitis. **  **    Signed By: Carroll Chaudhry MD on 1/6/2022 9:05 AM  Dictated by: Kahlil Pena on Thu Jan 6, 2022  8:58:15 AM EST  Signed By:Robert Rm MD [facility]  1/6/2022  9:05 AM    Scans Related to Order [id number]    [facility] Outpatient Order - Scan on 12/4/2021 [facility] [[id number]]      ~~This report was copied and pasted from the servicing EHR system. ~~         ABD      MRI Results (most recent):  No valid procedures specified. No diagnosis found.

## 2022-03-29 NOTE — DISCHARGE INSTRUCTIONS
Discharge Instructions      ACTIVITY: No heavy lifting for 1 week or until urine is clear, whichever is longer. ADDITIONAL INFORMATION:   You have indwelling ureteral stents which frequently causes slight discomfort in the flank region and bladder spasms. You can take flomax as needed for flank discomfort or Ditropan for bladder spasms. The indwelling stent will need to be removed/exchanged as directed below. If the stent is left in place without appropriate follow-up, it may become encrusted with stone and/or infected. If that occurs, you will require multiple additional surgeries to fix this. It is very important you follow up for appropriate removal or exchange of ureteral stents. If you experience any fevers > 100.4, significant nausea/vomiting, or significant worsening of pain, please contact us at the number below. It is common to experience mild, recurrent blood in your urine and this is likely due to stent irritation. The general trend should be decreasing bleeding with occasional flares due to increased activity. If the bleeding continues to worsen with passage of clots, you are unable to urinate, or you experience significant light-headedness, please contact us at the number below. If you develop new bleeding after a period of clear urine and this is severe, particularly if this is around 10 days after surgery, please contact our office immediately. If the bleeding is severe with clots or you feel lightheaded, please proceed immediately to the closest Emergency department. You may resume showering but do not directly scrub the incision. You may resume bathing in two week. Please inspect your incision daily, looking for signs of infection (increasing/spreading redness, swelling, drainage). Keep the incision clean and dry. Should urine leakage persist beyond one week, please contact [facility].       FOLLOW UP CARE:  You have a return in appointment on April 6th for cystoscopy and stent removal in the office with Dr. Jessica Tovar. Patient Education        Nephrostomy Tube Care: Care Instructions  Your Care Instructions     A nephrostomy tube is a thin catheter placed into your kidney to drain urine. You may have one tube in a kidney or two tubes, one in each kidney. The urine collects in a bag attached to the tube. In most cases, the bag is attached to your leg. Sometimes the catheter tube has a valve that lets you drain the urine into the toilet or other container. You may need a nephrostomy tube if you have a blockage or a hole in your urinary tract. The blockage may be caused by a kidney stone, infection, scar tissue, or a tumor. If you have only one tube, you still need to urinate. Your other kidney will still produce urine that will drain into your bladder. Having a nephrostomy tube in for a long time increases the risk of getting an infection. Nephrostomy tube care focuses on preventing infection. Follow-up care is a key part of your treatment and safety. Be sure to make and go to all appointments, and call your doctor if you are having problems. It's also a good idea to know your test results and keep a list of the medicines you take. How can you care for yourself at home? · Wash your hands before you handle the nephrostomy tube. · Clean the area around the tube with soap and water every day. · Keep the drainage bag lower than your kidney to keep urine from backing up. · If you have been told you can reuse your bag, you can clean the bag after removing it from the tube. Use another container to collect your urine while you clean the bag. To clean the bag, fill it with 2 parts vinegar to 3 parts water, and let it stand for 20 minutes. Then empty it out, and let it air dry. · Empty the drainage bag before it is completely full or every 2 to 3 hours. · Do not swim or take baths while you have a nephrostomy tube.  You can shower after wrapping the end of the nephrostomy tube with plastic wrap. · Change the dressing around the nephrostomy tube about every 3 days or when it gets wet or dirty. A nurse will teach you how to change the dressing. When should you call for help? Call your doctor now or seek immediate medical care if:    · You have new or worse symptoms of a kidney infection. These may include:  ? Pain or burning when you urinate. ? A frequent need to urinate without being able to pass much urine. ? Pain in the flank, which is just below the rib cage and above the waist on either side of the back. ? Blood in the urine. ? A fever.     · You are vomiting or nauseated.     · Your tube leaks.     · Urine does not collect in the drainage bag.     · You have pain or bleeding around the tube. Watch closely for changes in your health, and be sure to contact your doctor if:    · You do not get better as expected. Where can you learn more? Go to http://www.gray.com/  Enter T729 in the search box to learn more about \"Nephrostomy Tube Care: Care Instructions. \"  Current as of: September 8, 2021               Content Version: 13.2  © 6777-9752 [employer]. Care instructions adapted under license by [employer] (which disclaims liability or warranty for this information). If you have questions about a medical condition or this instruction, always ask your healthcare professional. [address] any warranty or liability for your use of this information. DISCHARGE SUMMARY from Nurse    PATIENT INSTRUCTIONS:    After general anesthesia or intravenous sedation, for 24 hours or while taking prescription Narcotics:  · Limit your activities  · Do not drive and operate hazardous machinery  · Do not make important personal or business decisions  · Do  not drink alcoholic beverages  · If you have not urinated within 8 hours after discharge, please contact your surgeon on call.     Report the following to your surgeon:  · Excessive pain, swelling, redness or odor of or around the surgical area  · Temperature over 100.5  · Nausea and vomiting lasting longer than 4 hours or if unable to take medications  · Any signs of decreased circulation or nerve impairment to extremity: change in color, persistent  numbness, tingling, coldness or increase pain  · Any questions    What to do at Home:      *  Please give a list of your current medications to your Primary Care Provider. *  Please update this list whenever your medications are discontinued, doses are      changed, or new medications (including over-the-counter products) are added. *  Please carry medication information at all times in case of emergency situations. These are general instructions for a healthy lifestyle:    No smoking/ No tobacco products/ Avoid exposure to second hand smoke  Surgeon General's Warning:  Quitting smoking now greatly reduces serious risk to your health. Obesity, smoking, and sedentary lifestyle greatly increases your risk for illness    A healthy diet, regular physical exercise & weight monitoring are important for maintaining a healthy lifestyle    You may be retaining fluid if you have a history of heart failure or if you experience any of the following symptoms:  Weight gain of 3 pounds or more overnight or 5 pounds in a week, increased swelling in our hands or feet or shortness of breath while lying flat in bed. Please call your doctor as soon as you notice any of these symptoms; do not wait until your next office visit. The discharge information has been reviewed with the patient. The patient verbalized understanding. Discharge medications reviewed with the patient and appropriate educational materials and side effects teaching were provided.   ___________________________________________________________________________________________________________________________________

## 2022-03-29 NOTE — OP NOTES
Operative Note  Patient: John Talamantes               Sex: male             MRN: [id number]  YOB: 1955      Age:  73 y.o. 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 Preop culture negative. Procedure:    1) Cystoscopy  2) Flexible ureteroscopy    3) Surgeon acquired percutaneous renal access   4) Percutaneous nephrolithotomy   5) Ureteral stent placement     Findings:    1). Normal cystoscopy  2)  Ureteroscopy revealed large volume lower pole stones   3). Left Percutaneous renal acces obtained via bullseye technique, lower pole    4). Total stone burden 4cm, visually completely cleared   5). 8x26 JJ stent placed    Narrative of events: The patient was brought to the operative suite. Anesthesia was induced and preoperative antibiotics were administered. The were prepped in split leg prone position with access to the urethra and to the flank. After appropriate pausea nd confirmation of antibiotic administration, cystoscopy was performed. Prostate was with mild bilobar hypertrophy. Bladder was normal.  Left ureteral orifice was identified and cannulated with a 0.035 sensor wire. A duel lumen was advanced and retrogarde pyelogram performed revealing no hydronephrosis. Second wire was advanced and an access sheath was advanced to the proximal ureter. Flexible ureteroscopy was then performed. This revealed a conglomerate of stones in multiple lower pole calyces. A lower pole calyx was identified that would be adequate for access. Bullseye technique was utilized to enter the calyx. This was observed both fluoroscopically and endoscopically.   A lake wire was advanced and grasped with a basket with the flexible ureteroscopy and brought out of the urethra thus obtaining through and through access. A tigertail catheter was advanced over the wire all the way into the bladder and out the urethra to then exchange the wire for a super stiff. The balloon was advanced over the super stiff and confirmed just at the point of entry into the calyx endoscopically. The balloon was inflated and a 24 fr sheath was advanced over the balloon which was removed and rigid nephroscope was advanced. The ultrasonic lithotriptor was used to break up all the stone and remove it. Lluvia Hernandez was sent for analysis. Flexible nephroscopy was then performed to ensure no residual fragments. Retrograde flexible ureteroscopy was then performed to clear the ureter. A JJ stent was then advanced over the wire and deployed under direct fluoroscopic guidance in the renal pelvis and in the bladder. The sheath was removed and there was minimal bleeding from the puncture site. Patient was awoken from anesthesia and tolerated the procedure well. There were then transferred to the recovery room in stable condition. Estimated Blood Loss: 100CC     Anesthesia:  General                  Implants:   Implant Name Type Inv. Item Serial No.  Lot No. LRB No. Used Action   STENT URET POLARIS ULT [id number] -- PERCUFLEX - [id number]  STENT URET POLARIS ULT [id number] -- [id number] [employer]_ [id number] Left 1 Implanted       Specimens:   ID Type Source Tests Collected by Time Destination   1 : left kidney stones for analysis  Kidney, Left  Chava Cuellar MD 3/29/2022  9:44 AM Pathology   1 : left kidney urine culture Urine Kidney, Left CULTURE, URINE Chava Cuellar MD 3/29/2022  9:43 AM Microbiology        Complications:  None    Plan:  1. CT in PACU  2. Labs in PACU  3.  If all WNL, home today, otherwise admit overnight for obs       Kristina Flores MD  3/29/2022

## 2022-03-31 LAB
BACTERIA SPEC CULT: NORMAL
SERVICE CMNT-IMP: NORMAL

## 2022-04-03 LAB
CALCIUM OXALATE DIHYDRATE MFR STONE IR: 90 %
COLOR STONE: NORMAL
COM MFR STONE: 10 %
COMMENT, 120677: NORMAL
COMMENT, 519994: NORMAL
COMPOSITION, 120440: NORMAL
DISCLAIMER, STO32L: NORMAL
PHOTO, 120675: NORMAL
PLEASE NOTE, 130422: NORMAL
SIZE STONE: NORMAL MM
SPECIMEN SOURCE: NORMAL
WT STONE: 662 MG

## 2024-10-08 ENCOUNTER — OFFICE VISIT (OUTPATIENT)
Age: 69
End: 2024-10-08
Payer: MEDICARE

## 2024-10-08 VITALS
SYSTOLIC BLOOD PRESSURE: 121 MMHG | HEART RATE: 89 BPM | BODY MASS INDEX: 22.59 KG/M2 | TEMPERATURE: 98 F | DIASTOLIC BLOOD PRESSURE: 76 MMHG | HEIGHT: 74 IN | WEIGHT: 176 LBS | OXYGEN SATURATION: 96 %

## 2024-10-08 DIAGNOSIS — S39.012A BACK STRAIN, INITIAL ENCOUNTER: Primary | ICD-10-CM

## 2024-10-08 PROCEDURE — 1123F ACP DISCUSS/DSCN MKR DOCD: CPT | Performed by: NURSE PRACTITIONER

## 2024-10-08 PROCEDURE — 96372 THER/PROPH/DIAG INJ SC/IM: CPT | Performed by: NURSE PRACTITIONER

## 2024-10-08 PROCEDURE — G8427 DOCREV CUR MEDS BY ELIG CLIN: HCPCS | Performed by: NURSE PRACTITIONER

## 2024-10-08 PROCEDURE — 99214 OFFICE O/P EST MOD 30 MIN: CPT | Performed by: NURSE PRACTITIONER

## 2024-10-08 PROCEDURE — 3017F COLORECTAL CA SCREEN DOC REV: CPT | Performed by: NURSE PRACTITIONER

## 2024-10-08 PROCEDURE — G8420 CALC BMI NORM PARAMETERS: HCPCS | Performed by: NURSE PRACTITIONER

## 2024-10-08 PROCEDURE — G8484 FLU IMMUNIZE NO ADMIN: HCPCS | Performed by: NURSE PRACTITIONER

## 2024-10-08 PROCEDURE — 1036F TOBACCO NON-USER: CPT | Performed by: NURSE PRACTITIONER

## 2024-10-08 RX ORDER — METHOCARBAMOL 500 MG/1
500 TABLET, FILM COATED ORAL 3 TIMES DAILY
Qty: 21 TABLET | Refills: 0 | Status: SHIPPED | OUTPATIENT
Start: 2024-10-08 | End: 2024-10-15

## 2024-10-08 RX ORDER — METHYLPREDNISOLONE 4 MG
TABLET, DOSE PACK ORAL
Qty: 1 KIT | Refills: 0 | Status: SHIPPED | OUTPATIENT
Start: 2024-10-08 | End: 2024-10-14

## 2024-10-08 RX ORDER — KETOROLAC TROMETHAMINE 30 MG/ML
30 INJECTION, SOLUTION INTRAMUSCULAR; INTRAVENOUS ONCE
Status: COMPLETED | OUTPATIENT
Start: 2024-10-08 | End: 2024-10-08

## 2024-10-08 RX ADMIN — KETOROLAC TROMETHAMINE 30 MG: 30 INJECTION, SOLUTION INTRAMUSCULAR; INTRAVENOUS at 15:32

## 2024-10-08 NOTE — PROGRESS NOTES
Consent was explained to the pt and signed. No questions or concerns voiced at this time. Pt given 30mg/1ml of toradol IM in right gluteus. No sign or symptoms of infection noted at injection site. There was no bleeding, swelling or leaking noted after injection. Pt handed injection information sheet to take home. The pt tolerated the injection well and did not want to wait in the exam room for observation. He ambulated to check out with out any issues.    
Sharath Denton presents today for   Chief Complaint   Patient presents with    Lower Back Pain     Right side       Is someone accompanying this pt? no    Is the patient using any DME equipment during OV? no      Coordination of Care:  1. Have you been to the ER, urgent care clinic since your last visit? no  Hospitalized since your last visit? no    2. Have you seen or consulted any other health care providers outside of the [facility] since your last visit? Yes, pcp  Include any pap smears or colon screening. no      
systems:    Past Medical History:   Diagnosis Date    Anxiety     GERD (gastroesophageal reflux disease)     HTN (hypertension)     Insomnia     Kidney stones     Neuropathy     chronic    Vitamin D deficiency      Past Surgical History:   Procedure Laterality Date    LITHOTRIPSY      , ,  and  , ,    OTHER SURGICAL HISTORY      small skin cancer removed from left side of face    WISDOM TOOTH EXTRACTION       Social History     Socioeconomic History    Marital status:      Spouse name: Not on file    Number of children: Not on file    Years of education: Not on file    Highest education level: Not on file   Occupational History    Not on file   Tobacco Use    Smoking status: Former     Current packs/day: 0.00     Types: Cigarettes     Quit date: 2011     Years since quittin.7    Smokeless tobacco: Never   Substance and Sexual Activity    Alcohol use: Never    Drug use: Never    Sexual activity: Not on file   Other Topics Concern    Not on file   Social History Narrative    Not on file     Social Determinants of Health     Financial Resource Strain: Not on file   Food Insecurity: Not on file   Transportation Needs: Not on file   Physical Activity: Not on file   Stress: Not on file   Social Connections: Not on file   Intimate Partner Violence: Not on file   Housing Stability: Not on file     No family history on file.    Physical Exam:  /76 (Site: Left Upper Arm, Position: Sitting, Cuff Size: Medium Adult)   Pulse 89   Temp 98 °F (36.7 °C) (Oral)   Ht 1.88 m (6' 2\")   Wt 79.8 kg (176 lb)   SpO2 96% Comment: RA  BMI 22.60 kg/m²   Pain Scale: 4/10        We have informed Sharath Denton to notify us for immediate appointment if he has any worsening neurogical symptoms or if an emergency situation presents, then call 911      Please note that this dictation was completed with [employer] the [employer] voice recognition software.  Quite often unanticipated grammatical,

## 2025-03-26 ENCOUNTER — OFFICE VISIT (OUTPATIENT)
Age: 70
End: 2025-03-26
Payer: MEDICARE

## 2025-03-26 VITALS
HEART RATE: 76 BPM | BODY MASS INDEX: 23.9 KG/M2 | OXYGEN SATURATION: 96 % | RESPIRATION RATE: 18 BRPM | TEMPERATURE: 98.2 F | HEIGHT: 74 IN | SYSTOLIC BLOOD PRESSURE: 102 MMHG | WEIGHT: 186.2 LBS | DIASTOLIC BLOOD PRESSURE: 62 MMHG

## 2025-03-26 DIAGNOSIS — M47.816 LUMBAR SPONDYLOSIS: ICD-10-CM

## 2025-03-26 DIAGNOSIS — M51.360 DEGENERATION OF INTERVERTEBRAL DISC OF LUMBAR REGION WITH DISCOGENIC BACK PAIN: ICD-10-CM

## 2025-03-26 DIAGNOSIS — M54.50 CHRONIC BILATERAL LOW BACK PAIN WITHOUT SCIATICA: ICD-10-CM

## 2025-03-26 DIAGNOSIS — S32.020A CLOSED COMPRESSION FRACTURE OF L2 VERTEBRA, INITIAL ENCOUNTER (HCC): Primary | ICD-10-CM

## 2025-03-26 DIAGNOSIS — S32.020A CLOSED COMPRESSION FRACTURE OF L2 VERTEBRA, INITIAL ENCOUNTER (HCC): ICD-10-CM

## 2025-03-26 DIAGNOSIS — G89.29 CHRONIC BILATERAL LOW BACK PAIN WITHOUT SCIATICA: ICD-10-CM

## 2025-03-26 PROCEDURE — 99214 OFFICE O/P EST MOD 30 MIN: CPT | Performed by: NURSE PRACTITIONER

## 2025-03-26 PROCEDURE — 3017F COLORECTAL CA SCREEN DOC REV: CPT | Performed by: NURSE PRACTITIONER

## 2025-03-26 PROCEDURE — 1125F AMNT PAIN NOTED PAIN PRSNT: CPT | Performed by: NURSE PRACTITIONER

## 2025-03-26 PROCEDURE — 1160F RVW MEDS BY RX/DR IN RCRD: CPT | Performed by: NURSE PRACTITIONER

## 2025-03-26 PROCEDURE — 1123F ACP DISCUSS/DSCN MKR DOCD: CPT | Performed by: NURSE PRACTITIONER

## 2025-03-26 PROCEDURE — G8420 CALC BMI NORM PARAMETERS: HCPCS | Performed by: NURSE PRACTITIONER

## 2025-03-26 PROCEDURE — G8427 DOCREV CUR MEDS BY ELIG CLIN: HCPCS | Performed by: NURSE PRACTITIONER

## 2025-03-26 PROCEDURE — 1036F TOBACCO NON-USER: CPT | Performed by: NURSE PRACTITIONER

## 2025-03-26 PROCEDURE — 72100 X-RAY EXAM L-S SPINE 2/3 VWS: CPT | Performed by: NURSE PRACTITIONER

## 2025-03-26 PROCEDURE — 1159F MED LIST DOCD IN RCRD: CPT | Performed by: NURSE PRACTITIONER

## 2025-03-26 RX ORDER — MELOXICAM 15 MG/1
15 TABLET ORAL DAILY
Qty: 30 TABLET | Refills: 1 | Status: SHIPPED | OUTPATIENT
Start: 2025-03-26

## 2025-03-26 NOTE — PROGRESS NOTES
Sharath Denton presents today for   Chief Complaint   Patient presents with    Back Problem    Pain    Back Pain       Is someone accompanying this pt? no    Is the patient using any DME equipment during OV? no    Depression Screening:       No data to display                Learning Assessment:  Failed to redirect to the Timeline version of the [employer] SmartLink.    Abuse Screening:       No data to display                Fall Risk  Failed to redirect to the Timeline version of the [employer] SmartLink.    OPIOID RISK TOOL  Failed to redirect to the Timeline version of the [employer] SmartLink.    Coordination of Care:  1. Have you been to the ER, urgent care clinic since your last visit? no  Hospitalized since your last visit? no    2. Have you seen or consulted any other health care providers outside of the [facility] since your last visit? no Include any pap smears or colon screening. no

## 2025-03-26 NOTE — PROGRESS NOTES
Chief complaint   Chief Complaint   Patient presents with    Back Problem    Pain    Back Pain       History of Present Illness:  Sharath Denton is a  69 y.o.  male who comes in today after last being seen October 8, 2024 at which time he had injured his back picking up a 50 pound object.  I gave him a Medrol Dosepak and a Toradol injection at that time which she states did help at the time but about a month later the pain returned and he is right greater than left low back pain without buttock or radicular pain.  He states in the morning the pain is a crushing pain where he can hardly get out of bed and move around.  Once he is able to get up and walking it gets better after a couple hours but if he walks more than half a mile at a time the pain gets bad again.  He denies fever bowel bladder dysfunction.  He is diabetic and has diabetic peripheral neuropathy.  His last A1c was 5.5.      Physical Exam: The patient is a 69-year-old male well-developed well-nourished who is alert and oriented with a normal mood and affect.  He has a full weightbearing nonantalgic gait.  Not using assist device.  He has 5 out of 5 strength bilateral lower extremities.  Negative straight leg raise.  He did not have pain with hyperextension lumbar spine.  He is mildly tender over the upper lumbar mid spine.      Assessment and Plan:.  This is a patient who is having back pain that is progressively getting worse.  I will get x-ray of his lumbar spine.  This demonstrates an L2 compression fracture, spondylosis and degenerative disc disease.  I will get a MRI of the lumbar spine.  I will try him on Mobic 15 mg daily.  He is already on Percocet for pain.  This is through another provider.  We will see him back in May with Dr. Tinsley where he has already an appointment.    XRAY: 03/26/25   body part: Lumbar  side (rt/lt): bilateral  number of views taken:2  Findings: as above    The x-ray will be officially read by Dr. Wetzel and scanned

## 2025-05-07 ENCOUNTER — TELEPHONE (OUTPATIENT)
Age: 70
End: 2025-05-07

## 2025-05-07 NOTE — TELEPHONE ENCOUNTER
I tried calling the pt to discuss if the pt has had the MRI ordered at the prev. Office visit and there was no answer. I LMOM with my name and the office phone number requesting a call back to discuss.

## 2025-05-12 ENCOUNTER — OFFICE VISIT (OUTPATIENT)
Age: 70
End: 2025-05-12
Payer: MEDICARE

## 2025-05-12 VITALS — TEMPERATURE: 98 F | HEIGHT: 74 IN | WEIGHT: 191 LBS | BODY MASS INDEX: 24.51 KG/M2

## 2025-05-12 DIAGNOSIS — S32.020A CLOSED COMPRESSION FRACTURE OF L2 VERTEBRA, INITIAL ENCOUNTER (HCC): Primary | ICD-10-CM

## 2025-05-12 PROCEDURE — 99214 OFFICE O/P EST MOD 30 MIN: CPT | Performed by: PHYSICAL MEDICINE & REHABILITATION

## 2025-05-12 PROCEDURE — 1125F AMNT PAIN NOTED PAIN PRSNT: CPT | Performed by: PHYSICAL MEDICINE & REHABILITATION

## 2025-05-12 PROCEDURE — 1159F MED LIST DOCD IN RCRD: CPT | Performed by: PHYSICAL MEDICINE & REHABILITATION

## 2025-05-12 PROCEDURE — 1160F RVW MEDS BY RX/DR IN RCRD: CPT | Performed by: PHYSICAL MEDICINE & REHABILITATION

## 2025-05-12 PROCEDURE — 1123F ACP DISCUSS/DSCN MKR DOCD: CPT | Performed by: PHYSICAL MEDICINE & REHABILITATION

## 2025-05-12 PROCEDURE — G8427 DOCREV CUR MEDS BY ELIG CLIN: HCPCS | Performed by: PHYSICAL MEDICINE & REHABILITATION

## 2025-05-12 PROCEDURE — 1036F TOBACCO NON-USER: CPT | Performed by: PHYSICAL MEDICINE & REHABILITATION

## 2025-05-12 PROCEDURE — 3017F COLORECTAL CA SCREEN DOC REV: CPT | Performed by: PHYSICAL MEDICINE & REHABILITATION

## 2025-05-12 PROCEDURE — G8420 CALC BMI NORM PARAMETERS: HCPCS | Performed by: PHYSICAL MEDICINE & REHABILITATION

## 2025-05-12 RX ORDER — MELOXICAM 15 MG/1
15 TABLET ORAL DAILY
Qty: 15 TABLET | Refills: 0 | Status: SHIPPED | OUTPATIENT
Start: 2025-05-12

## 2025-05-12 NOTE — PROGRESS NOTES
[facility]  [address]  [place] [address]  Tel: [phone]  Fax: [phone]          INITIAL CONSULTATION      HISTORY OF PRESENT ILLNESS:  Sharath Denton is a 70 y.o. male who is new to me, last seen by ARIADNE Mendoza on 3/26/2025. He rates his pain  0-3 /10. Patient comes into the office with c/o localized lower back pain, ongoing for 7 months with injury from picking up a 50lb object.     Patient says his pain has improved since the initial onset.  He denies change in bowel or bladder habits. His lower back pain is exacerbated by prolonged standing and walking longer than .5 miles. He has a hx of DM. He reports that to his knowledge his kidneys function properly, GFR of 89 on 9/8/2022. He has taken OXYCODONE (chronic); secondary to neuropathy, Gabapentin 300 mg QID, Mobic QD.       PmHx of DM, neuropathy.     Note from Elana RON dated 3/26/2025 indicating patient was seen for back pain that is progressively getting worse. Last seen in October 8, 2024 at which time he had injured his back picking up a 50 pound object. I gave him a Medrol Dosepak and a Toradol injection at that time which she states did help at the time but about a month later the pain returned and he is right greater than left low back pain without buttock or radicular pain. Once he is able to get up and walking it gets better after a couple hours but if he walks more than half a mile at a time the pain gets bad again. He is diabetic and has diabetic peripheral neuropathy. I will try him on Mobic 15 mg daily. He is already on Percocet for pain.     MRI Lumbar Spine w/o dated 4/23/2025 films were independently reviewed by me. Per report, Small edematous Schmorl's nodes superior L2 endplate, could be a source of pain. No significant canal stenosis. Up to moderate neuroforaminal stenosis at L4-L5 and left L5-S1.      reviewed. Body mass index is 24.52 kg/m².    PCP:

## 2025-05-29 ENCOUNTER — HOSPITAL ENCOUNTER (OUTPATIENT)
Age: 70
Discharge: HOME | End: 2025-05-29
Payer: MEDICARE

## 2025-05-29 DIAGNOSIS — N20.0 KIDNEY STONE: ICD-10-CM

## 2025-05-29 PROCEDURE — 74176 CT ABD & PELVIS W/O CONTRAST: CPT

## 2025-06-18 DIAGNOSIS — M47.816 LUMBAR SPONDYLOSIS: ICD-10-CM

## 2025-06-18 DIAGNOSIS — M51.360 DEGENERATION OF INTERVERTEBRAL DISC OF LUMBAR REGION WITH DISCOGENIC BACK PAIN: ICD-10-CM

## 2025-06-18 RX ORDER — MELOXICAM 15 MG/1
15 TABLET ORAL DAILY
Qty: 30 TABLET | Refills: 0 | Status: SHIPPED | OUTPATIENT
Start: 2025-06-18 | End: 2025-07-18

## 2025-06-18 NOTE — TELEPHONE ENCOUNTER
Patient last seen 5/12/2025 by Dr. Wetzel and he was given Meloxicam 15 mg #15 take 1 po daily prn with no refills. Patient has a follow up 7/15/2025. I have pended a 30 day supply. Please advise.

## 2025-07-15 ENCOUNTER — OFFICE VISIT (OUTPATIENT)
Age: 70
End: 2025-07-15
Payer: MEDICARE

## 2025-07-15 VITALS
OXYGEN SATURATION: 96 % | BODY MASS INDEX: 22.41 KG/M2 | DIASTOLIC BLOOD PRESSURE: 76 MMHG | WEIGHT: 174.6 LBS | SYSTOLIC BLOOD PRESSURE: 113 MMHG | TEMPERATURE: 98.2 F | HEART RATE: 86 BPM | HEIGHT: 74 IN | RESPIRATION RATE: 18 BRPM

## 2025-07-15 DIAGNOSIS — S32.020A CLOSED COMPRESSION FRACTURE OF L2 VERTEBRA, INITIAL ENCOUNTER (HCC): ICD-10-CM

## 2025-07-15 DIAGNOSIS — M54.50 CHRONIC BILATERAL LOW BACK PAIN WITHOUT SCIATICA: ICD-10-CM

## 2025-07-15 DIAGNOSIS — G89.29 CHRONIC BILATERAL LOW BACK PAIN WITHOUT SCIATICA: ICD-10-CM

## 2025-07-15 DIAGNOSIS — M47.816 LUMBAR SPONDYLOSIS: Primary | ICD-10-CM

## 2025-07-15 DIAGNOSIS — M51.360 DEGENERATION OF INTERVERTEBRAL DISC OF LUMBAR REGION WITH DISCOGENIC BACK PAIN: ICD-10-CM

## 2025-07-15 PROCEDURE — 1160F RVW MEDS BY RX/DR IN RCRD: CPT | Performed by: NURSE PRACTITIONER

## 2025-07-15 PROCEDURE — 99213 OFFICE O/P EST LOW 20 MIN: CPT | Performed by: NURSE PRACTITIONER

## 2025-07-15 PROCEDURE — 3017F COLORECTAL CA SCREEN DOC REV: CPT | Performed by: NURSE PRACTITIONER

## 2025-07-15 PROCEDURE — G8420 CALC BMI NORM PARAMETERS: HCPCS | Performed by: NURSE PRACTITIONER

## 2025-07-15 PROCEDURE — 1123F ACP DISCUSS/DSCN MKR DOCD: CPT | Performed by: NURSE PRACTITIONER

## 2025-07-15 PROCEDURE — G8427 DOCREV CUR MEDS BY ELIG CLIN: HCPCS | Performed by: NURSE PRACTITIONER

## 2025-07-15 PROCEDURE — 1125F AMNT PAIN NOTED PAIN PRSNT: CPT | Performed by: NURSE PRACTITIONER

## 2025-07-15 PROCEDURE — 1036F TOBACCO NON-USER: CPT | Performed by: NURSE PRACTITIONER

## 2025-07-15 PROCEDURE — 1159F MED LIST DOCD IN RCRD: CPT | Performed by: NURSE PRACTITIONER

## 2025-07-15 RX ORDER — MELOXICAM 15 MG/1
15 TABLET ORAL DAILY
Qty: 90 TABLET | Refills: 0 | Status: SHIPPED | OUTPATIENT
Start: 2025-07-15

## 2025-07-15 NOTE — PROGRESS NOTES
Sharath Denton presents today for   Chief Complaint   Patient presents with    Back Problem    Pain    Back Pain       Is someone accompanying this pt? no    Is the patient using any DME equipment during OV? no    Depression Screening:       No data to display                Learning Assessment:  Failed to redirect to the Timeline version of the [employer] SmartLink.    Abuse Screening:       No data to display                Fall Risk  Failed to redirect to the Timeline version of the [employer] SmartLink.    OPIOID RISK TOOL  Failed to redirect to the Timeline version of the [employer] SmartLink.    Coordination of Care:  1. Have you been to the ER, urgent care clinic since your last visit? no  Hospitalized since your last visit? no    2. Have you seen or consulted any other health care providers outside of the [facility] since your last visit? no Include any pap smears or colon screening. no

## 2025-07-15 NOTE — PROGRESS NOTES
Chief complaint   Chief Complaint   Patient presents with    Back Problem    Pain    Back Pain       History of Present Illness:  Sharath Denton is a  70 y.o.  male who comes in today after last seeing  on May 12, 2025.  He injured his back picking up a 50 pound object.  He had an MRI done that showed:  1. Small edematous Schmorl's nodes superior L2 endplate, could be a source of pain   2. No significant canal stenosis   3. Up to moderate neuroforaminal stenosis at L4-L5 and left L5-S1     Today he states he is doing okay and the Mobic 15 mg is helping.  He states typically he will take 1 Mobic 2 days in a row and then be off of it 1 to 2 days.  He finds that regimen is working well for him.  He does have diabetes and his last A1c was 5.4.  He does have diabetic peripheral neuropathy.  He denies fever bowel bladder dysfunction.        Physical Exam: The patient is a 70-year-old male well-developed well-nourished who is alert and oriented with a normal mood and affect.  He has a full weightbearing untucked gait.  Not using assist device.  He has 4-5 strength bilateral lower extremities.  Negative straight leg raise.  No he had mild pain with hyperextension lumbar spine.      Assessment and Plan:.  This is a patient who has this is called some endplate deformity at L2 and Dr. Wetzel has ordered a bone density test.  I gave him to number to scheduling so he can get that done.  I refilled his meloxicam.  We talked about his diabetes and keeping his blood sugar low.  We will see him back in 6 months sooner if needed    Sharath was seen today for back problem, pain and back pain.    Diagnoses and all orders for this visit:    Lumbar spondylosis  -     meloxicam (MOBIC) 15 MG tablet; Take 1 tablet by mouth daily    Degeneration of intervertebral disc of lumbar region with discogenic back pain  -     meloxicam (MOBIC) 15 MG tablet; Take 1 tablet by mouth daily    Closed compression fracture of L2 vertebra,

## 2025-08-14 ENCOUNTER — HOSPITAL ENCOUNTER (OUTPATIENT)
Facility: HOSPITAL | Age: 70
Discharge: HOME | End: 2025-08-17
Attending: PHYSICAL MEDICINE & REHABILITATION
Payer: MEDICARE

## 2025-08-14 DIAGNOSIS — S32.020A CLOSED COMPRESSION FRACTURE OF L2 VERTEBRA, INITIAL ENCOUNTER (HCC): ICD-10-CM

## 2025-08-14 PROCEDURE — 77080 DXA BONE DENSITY AXIAL: CPT

## (undated) DEVICE — TRAY PREP DRY W/ PREM GLV 2 APPL 6 SPNG 2 UNDPD 1 OVERWRAP

## (undated) DEVICE — SYR 10ML LUER LOK 1/5ML GRAD --

## (undated) DEVICE — COVER SURG EQUIP W36XL28IN W/ E BND ARND BTM

## (undated) DEVICE — GUIDEWIRE: Brand: AMPLATZ SUPER STIFF™

## (undated) DEVICE — SOFT SILICONE HYDROCELLULAR SACRUM DRESSING WITH LOCK AWAY LAYER: Brand: ALLEVYN LIFE SACRUM (LARGE) PACK OF 10

## (undated) DEVICE — PREP SKN CHLRAPRP APL 26ML STR --

## (undated) DEVICE — STERILE LATEX POWDER-FREE SURGICAL GLOVESWITH NITRILE COATING: Brand: PROTEXIS

## (undated) DEVICE — KIT PRB L440MM DIA3.9MM BLU SWISS LITHOCLAST TRIL

## (undated) DEVICE — POSITIONER HD REST FOAM CMFRT TCH

## (undated) DEVICE — GDWIRE URET ANG BNTSN .035X150 -- ZIPWIRE BENSTON STD

## (undated) DEVICE — STERILE POLYISOPRENE POWDER-FREE SURGICAL GLOVES: Brand: PROTEXIS

## (undated) DEVICE — NITINOL STONE RETRIEVAL BASKET: Brand: ZERO TIP

## (undated) DEVICE — PERCUTANEOUS ACCESS NEEDLE

## (undated) DEVICE — URETERAL ACCESS SHEATH SET: Brand: NAVIGATOR

## (undated) DEVICE — TAPE ADH CLTH SILK H2O REPELLENT CURAD

## (undated) DEVICE — DRAINBAG,ANTI-REFLUX TOWER,L/F,2000ML,LL: Brand: MEDLINE

## (undated) DEVICE — HIGH PRESSURE NEPHROSTOMY BALLOON CATHETER KIT: Brand: NEPHROMAX KIT

## (undated) DEVICE — SUTURE MCRYL SZ 4-0 L27IN ABSRB UD L24MM PS-1 3/8 CIR PRIM Y935H

## (undated) DEVICE — CATHETER,URETHRAL,REDRUBBER,STRL,10FR: Brand: MEDLINE INDUSTRIES, INC.

## (undated) DEVICE — DRAPE,REIN 53X77,STERILE: Brand: MEDLINE

## (undated) DEVICE — SINGLE-USE DIGITAL FLEXIBLE URETEROSCOPE: Brand: LITHOVUE

## (undated) DEVICE — FASCIAL INCISING NEEDLE USE WITH WIRE GUIDE .038: Brand: COOK

## (undated) DEVICE — SYR 20ML LL STRL LF --

## (undated) DEVICE — SOLUTION IRRIG 3000ML 0.9% SOD CHL FLX CONT 0797208] ICU MEDICAL INC]

## (undated) DEVICE — GAUZE,SPONGE,4"X4",12PLY,STERILE,LF,2'S: Brand: MEDLINE

## (undated) DEVICE — DERMABOND SKIN ADH 0.7ML -- DERMABOND ADVANCED 12/BX

## (undated) DEVICE — GDWIRE UROL STR 150CM FLX TP -- BX/5 SENSOR

## (undated) DEVICE — SYR 50ML LR LCK 1ML GRAD NSAF --

## (undated) DEVICE — TUBING IRRIG L77IN DIA0.241IN L BOR FOR CYSTO W/ NVENT

## (undated) DEVICE — 3M™ IOBAN™ 2 ANTIMICROBIAL INCISE DRAPE 6650EZ: Brand: IOBAN™ 2

## (undated) DEVICE — BLANKET WRM AD W50XL85.8IN PACU FULL BODY FORC AIR

## (undated) DEVICE — 4-PORT MANIFOLD: Brand: NEPTUNE 2

## (undated) DEVICE — SOLUTION IV 1000ML 0.9% SOD CHL

## (undated) DEVICE — GOWN,REINFORCED,POLY,AURORA,XLARGE,STRL: Brand: MEDLINE

## (undated) DEVICE — GDWIRE AMPLTZ SUP STF 0.038IN -- BX/5

## (undated) DEVICE — DRAPE PERC PROC W335XL196CM LINGEMAN

## (undated) DEVICE — LUB SURG MEDC STRL 2OZ TUBE MC -- MEDICHOICE

## (undated) DEVICE — CATHETER BALLOON DIL 10 MMX15 CM NEPHROSTOMY X-FORCE N30

## (undated) DEVICE — CATHETER URETH PED 22FR BLLN 3CC 2 W F INF CTRL BARDX

## (undated) DEVICE — KIT CLN UP BON SECOURS MARYV

## (undated) DEVICE — DUAL LUMEN URETERAL CATHETER

## (undated) DEVICE — GARMENT,MEDLINE,DVT,SEQUENTIAL,CALF,MD: Brand: MEDLINE

## (undated) DEVICE — 1016 S-DRAPE IRRIG POUCH 10/BOX: Brand: STERI-DRAPE™

## (undated) DEVICE — LEGGINGS, PAIR, 31X48, STERILE: Brand: MEDLINE

## (undated) DEVICE — DEVICE STBL AD TRICOT ANCHR PD FOR 3 W F CATH STATLOK

## (undated) DEVICE — TABLE COVER: Brand: CONVERTORS

## (undated) DEVICE — ADHESIVE TOP BENZ TINC SWABSTK --

## (undated) DEVICE — CHECK-FLO ADAPTER: Brand: CHECK-FLO

## (undated) DEVICE — CATH NEPHROSTOMY BALLOON DILAT XFORCE 15CM 8MM

## (undated) DEVICE — X-RAY SPONGES,12 PLY: Brand: DERMACEA

## (undated) DEVICE — GUIDEWIRE VASC L180CM DIA0.038IN L6CM TIP PTFE S STL STR

## (undated) DEVICE — DRAPE TWL SURG 16X26IN BLU ORB04] ALLCARE INC]

## (undated) DEVICE — GAUZE,SPONGE,4"X4",16PLY,STRL,LF,10/TRAY: Brand: MEDLINE

## (undated) DEVICE — PERCUTANEOUS ACCESS NEEDLE: Brand: NAVIGUIDE

## (undated) DEVICE — DRAPE XR C ARM 41X74IN LF --

## (undated) DEVICE — GARMENT,MEDLINE,DVT,INT,CALF,MED, GEN2: Brand: MEDLINE

## (undated) DEVICE — CATH URETH INTMIT ROB 16FR FUN -- CONVERT TO ITEM 179520

## (undated) DEVICE — 3M™ TEGADERM™ TRANSPARENT FILM DRESSING FRAME STYLE, 1629, 8 IN X 12 IN (20 CM X 30 CM), 10/CT 8CT/CASE: Brand: 3M™ TEGADERM™

## (undated) DEVICE — MEDI-VAC NON-CONDUCTIVE SUCTION TUBING: Brand: CARDINAL HEALTH

## (undated) DEVICE — SEALANT HEMOSTAT W/THROM 8ML -- SURGIFLO MATRIX

## (undated) DEVICE — SYR LR LCK 1ML GRAD NSAF 30ML --

## (undated) DEVICE — PACK PROCEDURE SURG MAJ W/ BASIN LF

## (undated) DEVICE — UROLOGY SET

## (undated) DEVICE — GDWIRE URET STR STD .035X150 -- ZIPWIRE STD

## (undated) DEVICE — (D)PREP SKN CHLRAPRP APPL 26ML -- CONVERT TO ITEM 371833

## (undated) DEVICE — BAG DRAINAGE CUST DISP

## (undated) DEVICE — SUTURE VCRL SZ 3-0 L27IN ABSRB UD L26MM SH 1/2 CIR J416H